# Patient Record
Sex: MALE | Race: WHITE | Employment: OTHER | URBAN - METROPOLITAN AREA
[De-identification: names, ages, dates, MRNs, and addresses within clinical notes are randomized per-mention and may not be internally consistent; named-entity substitution may affect disease eponyms.]

---

## 2017-06-08 ENCOUNTER — HOSPITAL ENCOUNTER (OUTPATIENT)
Dept: MRI IMAGING | Age: 67
Discharge: HOME | End: 2017-06-08
Attending: ORTHOPAEDIC SURGERY
Payer: COMMERCIAL

## 2017-06-08 VITALS — WEIGHT: 188 LBS | BODY MASS INDEX: 26.22 KG/M2

## 2017-06-08 DIAGNOSIS — M54.6 PAIN IN THORACIC SPINE: ICD-10-CM

## 2017-06-08 PROCEDURE — A9585 GADOBUTROL INJECTION: HCPCS | Performed by: ORTHOPAEDIC SURGERY

## 2017-06-08 PROCEDURE — 74011250636 HC RX REV CODE- 250/636: Performed by: ORTHOPAEDIC SURGERY

## 2017-06-08 PROCEDURE — 72157 MRI CHEST SPINE W/O & W/DYE: CPT

## 2017-06-08 RX ADMIN — GADOBUTROL 7.5 ML: 604.72 INJECTION INTRAVENOUS at 07:52

## 2018-10-31 ENCOUNTER — IMPORTED ENCOUNTER (OUTPATIENT)
Dept: URBAN - METROPOLITAN AREA CLINIC 1 | Facility: CLINIC | Age: 68
End: 2018-10-31

## 2018-10-31 PROBLEM — H25.813: Noted: 2018-10-31

## 2018-10-31 PROBLEM — H11.32: Noted: 2018-10-31

## 2018-10-31 PROBLEM — H04.123: Noted: 2018-10-31

## 2018-10-31 PROCEDURE — 92002 INTRM OPH EXAM NEW PATIENT: CPT

## 2018-12-20 ENCOUNTER — IMPORTED ENCOUNTER (OUTPATIENT)
Dept: URBAN - METROPOLITAN AREA CLINIC 1 | Facility: CLINIC | Age: 68
End: 2018-12-20

## 2018-12-20 PROBLEM — H40.1131: Noted: 2018-12-20

## 2018-12-20 PROBLEM — H43.812: Noted: 2018-12-20

## 2018-12-20 PROBLEM — H04.123: Noted: 2018-12-20

## 2018-12-20 PROBLEM — H25.813: Noted: 2018-12-20

## 2018-12-20 PROCEDURE — 92133 CPTRZD OPH DX IMG PST SGM ON: CPT

## 2018-12-20 PROCEDURE — 92014 COMPRE OPH EXAM EST PT 1/>: CPT

## 2019-01-29 ENCOUNTER — IMPORTED ENCOUNTER (OUTPATIENT)
Dept: URBAN - METROPOLITAN AREA CLINIC 1 | Facility: CLINIC | Age: 69
End: 2019-01-29

## 2019-01-29 PROBLEM — H40.1131: Noted: 2019-01-29

## 2019-01-29 PROBLEM — H43.812: Noted: 2019-01-29

## 2019-01-29 PROBLEM — H25.813: Noted: 2019-01-29

## 2019-01-29 PROBLEM — H04.123: Noted: 2019-01-29

## 2019-01-29 PROCEDURE — 92012 INTRM OPH EXAM EST PATIENT: CPT

## 2019-01-29 PROCEDURE — 92083 EXTENDED VISUAL FIELD XM: CPT

## 2019-05-22 ENCOUNTER — IMPORTED ENCOUNTER (OUTPATIENT)
Dept: URBAN - METROPOLITAN AREA CLINIC 1 | Facility: CLINIC | Age: 69
End: 2019-05-22

## 2019-05-22 PROBLEM — H02.834: Noted: 2019-05-22

## 2019-05-22 PROBLEM — H43.813: Noted: 2019-05-22

## 2019-05-22 PROBLEM — H02.831: Noted: 2019-05-22

## 2019-05-22 PROCEDURE — 92012 INTRM OPH EXAM EST PATIENT: CPT

## 2019-07-29 ENCOUNTER — IMPORTED ENCOUNTER (OUTPATIENT)
Dept: URBAN - METROPOLITAN AREA CLINIC 1 | Facility: CLINIC | Age: 69
End: 2019-07-29

## 2019-07-29 PROBLEM — H40.1131: Noted: 2019-07-29

## 2019-07-29 PROBLEM — H43.813: Noted: 2019-07-29

## 2019-07-29 PROCEDURE — 92012 INTRM OPH EXAM EST PATIENT: CPT

## 2020-01-28 ENCOUNTER — IMPORTED ENCOUNTER (OUTPATIENT)
Dept: URBAN - METROPOLITAN AREA CLINIC 1 | Facility: CLINIC | Age: 70
End: 2020-01-28

## 2020-01-28 PROBLEM — H40.1131: Noted: 2020-01-28

## 2020-01-28 PROCEDURE — 92083 EXTENDED VISUAL FIELD XM: CPT

## 2020-01-28 PROCEDURE — 92012 INTRM OPH EXAM EST PATIENT: CPT

## 2022-03-28 ENCOUNTER — OFFICE VISIT (OUTPATIENT)
Dept: SURGERY | Age: 72
End: 2022-03-28
Payer: MEDICARE

## 2022-03-28 VITALS
OXYGEN SATURATION: 98 % | WEIGHT: 176 LBS | DIASTOLIC BLOOD PRESSURE: 87 MMHG | HEIGHT: 71 IN | HEART RATE: 84 BPM | TEMPERATURE: 98.1 F | RESPIRATION RATE: 18 BRPM | BODY MASS INDEX: 24.64 KG/M2 | SYSTOLIC BLOOD PRESSURE: 139 MMHG

## 2022-03-28 DIAGNOSIS — K43.2 VENTRAL INCISIONAL HERNIA: Primary | ICD-10-CM

## 2022-03-28 PROCEDURE — 99202 OFFICE O/P NEW SF 15 MIN: CPT | Performed by: SURGERY

## 2022-03-28 PROCEDURE — 1101F PT FALLS ASSESS-DOCD LE1/YR: CPT | Performed by: SURGERY

## 2022-03-28 PROCEDURE — G8427 DOCREV CUR MEDS BY ELIG CLIN: HCPCS | Performed by: SURGERY

## 2022-03-28 PROCEDURE — 3017F COLORECTAL CA SCREEN DOC REV: CPT | Performed by: SURGERY

## 2022-03-28 PROCEDURE — G8510 SCR DEP NEG, NO PLAN REQD: HCPCS | Performed by: SURGERY

## 2022-03-28 PROCEDURE — G8420 CALC BMI NORM PARAMETERS: HCPCS | Performed by: SURGERY

## 2022-03-28 PROCEDURE — G8536 NO DOC ELDER MAL SCRN: HCPCS | Performed by: SURGERY

## 2022-03-28 RX ORDER — TIMOLOL MALEATE 5 MG/ML
SOLUTION/ DROPS OPHTHALMIC
COMMUNITY
Start: 2022-01-31

## 2022-03-28 RX ORDER — VALSARTAN 80 MG/1
80 TABLET ORAL DAILY
COMMUNITY
Start: 2022-02-09

## 2022-03-28 RX ORDER — ALFUZOSIN HYDROCHLORIDE 10 MG/1
10 TABLET, EXTENDED RELEASE ORAL DAILY
COMMUNITY
Start: 2022-03-10

## 2022-03-28 RX ORDER — DIAZEPAM 5 MG/1
TABLET ORAL
COMMUNITY
Start: 2022-01-08

## 2022-03-28 RX ORDER — CYCLOBENZAPRINE HCL 10 MG
TABLET ORAL
COMMUNITY
Start: 2022-03-10

## 2022-03-28 NOTE — PROGRESS NOTES
Asia Kiran is a 73 y.o. male who presents for evaluation of a ventral incisional hernia. Mr. Regan Rodriguez is s/p multiple spine surgeries in the past including what sounds like an ALIF. He has subsequently developed a bulge on the left side of his abdominal wall. Associated left sided abdominal discomfort with exertion. No nausea or vomiting. No chronic cough or constipation. Found to have a ventral incisional hernia as well as atrophy of the left rectus abdominis muscle on CT scan of the abdomen/pelvis. He has otherwise been in his usual state of health. CT scan abdomen/pelvis with po contrast - 2/8/2022 - Left-sided anterolateral abdominal wall hernia containing loops of small bowel and portions of the sigmoid colon. No evidence of bowel obstruction. Atrophy of left rectus abdominis muscle. No acute intra-abdominal or intra-pelvic process. Nonobstructing left renal calculus. Postoperative changes of the lumbar spine. Brachytherapy seeds within the prostate gland. (By report.  Films not available.)    Past Medical History:   Diagnosis Date    Arthritis     Family history of prostate cancer     Gout     Hypercholesteremia     Hypertrophy of prostate with urinary obstruction and other lower urinary tract symptoms (LUTS)     Kidney stone     Lumbar pain     Malignant neoplasm of prostate ([address] 12/19/2012    cT1c marisol 3 + 4 adenocarcinoma of the prostate under active surveillance since 12/2012    Muscle weakness     Nephrolithiasis     Prostate cancer ([address] 1/14    brachy done at [facility] [address] [facility] (small bowel obstruction) ([facility] [address] 2011    resolved    Ventral incisional hernia 3/28/2022     Past Surgical History:   Procedure Laterality Date    HX LUMBAR FUSION  1986    L4-S1     HX LUMBAR FUSION  Feb 2004    L1-L2     HX LUMBAR FUSION  2009    L1-2 FACET JOINT OBLATION AT [facility](10-15%RELIEF)    HX ORTHOPAEDIC      back    HX OTHER SURGICAL  2006    exploratory surgery on lef foot     HX OTHER SURGICAL  1/14    brachy at [facility]    [facility]    HX UROLOGICAL  12-19-12    PNBx - TRUS Vol 39.02 cc's, [place] 6(3+3)left apex, apex lat, base lat, Dr Rhianna Alexander HX VASECTOMY  2007    Dr Jose Pool     Family History   Problem Relation Age of Onset    Heart Disease Father     Cancer Father         prostate     Social History     Socioeconomic History    Marital status:    Tobacco Use    Smoking status: Never Smoker    Smokeless tobacco: Never Used   Substance and Sexual Activity    Alcohol use: Yes     Alcohol/week: 14.0 standard drinks     Types: 7 Glasses of wine, 7 Cans of beer per week    Drug use: No     Review of systems negative except as noted. Review of Systems   Respiratory: Negative for cough. Gastrointestinal: Positive for abdominal pain. Negative for constipation, nausea and vomiting. Musculoskeletal: Positive for back pain and myalgias. Physical Exam  Vitals reviewed. Constitutional:       General: He is not in acute distress. Appearance: Normal appearance. He is normal weight. HENT:      Head: Normocephalic and atraumatic. Eyes:      General: No scleral icterus. Cardiovascular:      Rate and Rhythm: Normal rate and regular rhythm. Pulmonary:      Effort: Pulmonary effort is normal.      Breath sounds: Normal breath sounds. Abdominal:      General: There is no distension. Palpations: Abdomen is soft. Tenderness: There is no abdominal tenderness. There is no guarding or rebound. Hernia: A hernia is present. Hernia is present in the ventral area (Left paramedian.). Comments: Well healed left paramedian scar. Musculoskeletal:         General: Normal range of motion. Cervical back: Neck supple. Lymphadenopathy:      Cervical: No cervical adenopathy. Neurological:      General: No focal deficit present. Mental Status: He is alert.         ASSESSMENT and PLAN  Mr. Cosme Howard is a 72 yo man with a left, paramedian ventral incisional hernia. In view of the findings on H and P and CT scan report, he should benefit from repair of the ventral incisional hernia as it is symptomatic. Would like to review the CT scan to better assess the hernia prior to surgery. Therefore, will obtain films from [address] [place] [address] In terms of the atrophy of the left rectus abdominis, explained to Mr. Rudy Odonnell that affect the outcome of surgery as the procedure involves repair of the fascial defect and placement of mesh. Activity as tolerated for now. Will see in two more weeks or earlier if need be.       CC: Joaquin Funez MD

## 2022-03-28 NOTE — PROGRESS NOTES
1. Have you been to the ER, urgent care clinic since your last visit? Hospitalized since your last visit? No    2. Have you seen or consulted any other health care providers outside of the [address] [place] [address] since your last visit? Include any pap smears or colon screening.  No

## 2022-04-02 ASSESSMENT — VISUAL ACUITY
OS_CC: 20/40
OS_CC: 20/25
OS_PH: SC 20/30 +1
OS_CC: 20/30
OD_CC: 20/20-2
OD_CC: 20/20
OD_CC: J1
OD_GLARE: 20/400
OD_CC: 20/20
OS_CC: J1
OD_CC: 20/20
OS_CC: 20/30-1
OS_CC: 20/30-2
OS_GLARE: 20/400
OD_CC: 20/25-1
OS_CC: 20/30-2
OD_CC: 20/25-1

## 2022-04-02 ASSESSMENT — TONOMETRY
OS_IOP_MMHG: 21
OS_IOP_MMHG: 26
OS_IOP_MMHG: 19
OS_IOP_MMHG: 20
OD_IOP_MMHG: 17
OD_IOP_MMHG: 20
OD_IOP_MMHG: 19
OD_IOP_MMHG: 26
OS_IOP_MMHG: 18
OD_IOP_MMHG: 18
OD_IOP_MMHG: 18
OS_IOP_MMHG: 24

## 2022-04-02 ASSESSMENT — KERATOMETRY
OS_K2POWER_DIOPTERS: 42.25
OS_AXISANGLE2_DEGREES: 080
OS_AXISANGLE_DEGREES: 170
OS_K1POWER_DIOPTERS: 41.75
OD_AXISANGLE2_DEGREES: 085
OD_K1POWER_DIOPTERS: 41.25
OD_AXISANGLE_DEGREES: 175
OD_K2POWER_DIOPTERS: 41.50

## 2022-04-18 ENCOUNTER — OFFICE VISIT (OUTPATIENT)
Dept: SURGERY | Age: 72
End: 2022-04-18
Payer: MEDICARE

## 2022-04-18 VITALS
SYSTOLIC BLOOD PRESSURE: 136 MMHG | HEIGHT: 71 IN | TEMPERATURE: 97.3 F | DIASTOLIC BLOOD PRESSURE: 82 MMHG | WEIGHT: 195.7 LBS | BODY MASS INDEX: 27.4 KG/M2 | HEART RATE: 61 BPM | RESPIRATION RATE: 20 BRPM | OXYGEN SATURATION: 98 %

## 2022-04-18 DIAGNOSIS — K43.2 INCISIONAL HERNIA, WITHOUT OBSTRUCTION OR GANGRENE: Primary | ICD-10-CM

## 2022-04-18 DIAGNOSIS — K42.9 UMBILICAL HERNIA WITHOUT OBSTRUCTION AND WITHOUT GANGRENE: ICD-10-CM

## 2022-04-18 PROCEDURE — G8536 NO DOC ELDER MAL SCRN: HCPCS | Performed by: SURGERY

## 2022-04-18 PROCEDURE — 1101F PT FALLS ASSESS-DOCD LE1/YR: CPT | Performed by: SURGERY

## 2022-04-18 PROCEDURE — 99215 OFFICE O/P EST HI 40 MIN: CPT | Performed by: SURGERY

## 2022-04-18 PROCEDURE — G8432 DEP SCR NOT DOC, RNG: HCPCS | Performed by: SURGERY

## 2022-04-18 PROCEDURE — 3017F COLORECTAL CA SCREEN DOC REV: CPT | Performed by: SURGERY

## 2022-04-18 PROCEDURE — G8419 CALC BMI OUT NRM PARAM NOF/U: HCPCS | Performed by: SURGERY

## 2022-04-18 PROCEDURE — G8427 DOCREV CUR MEDS BY ELIG CLIN: HCPCS | Performed by: SURGERY

## 2022-04-18 RX ORDER — TRAMADOL HYDROCHLORIDE 50 MG/1
TABLET ORAL
COMMUNITY
Start: 2022-01-05

## 2022-04-18 RX ORDER — LATANOPROST/PF 0.005 %
1 DROPS OPHTHALMIC (EYE) DAILY
COMMUNITY

## 2022-04-18 NOTE — PROGRESS NOTES
HISTORY OF PRESENT ILLNESS  Riky Mai is a 73 y.o. male. Reviewed and independently interpreted CT scan images from earlier this month. The oblique muscles are robust.  He has a large hernia to the left of midline. The external oblique and external rectus fascia appears to be intact. He is missing a 10 cm long segment of his left rectus muscle this is associated with a detachment of the internal oblique and transversalis muscles. At the cephalad extent of the hernia, there is a loop of bowel caught between the external and internal oblique muscles. There are clips or calcified material along the lateral border of the left rectus muscle in the left upper quadrant. He has a small umbilical hernia as well. Hernia present for a while. In the middle of November became worse  Pain. No nausea. Appetite ok  +chronic back pain  Multiple prior back surgeries. Some BPH    He has been working on some exercises for his core and recently his back and the hernia have actually been feeling better. ____________________________________________________________________________  Patient presents with:  Possible Hernia: seen at the request of himself for evaluation of abdominal wall hernia    /82 (BP 1 Location: Left upper arm, BP Patient Position: Sitting)   Pulse 61   Temp 97.3 °F (36.3 °C) (Temporal)   Resp 20   Ht 5' 11\" (1.803 m)   Wt 88.8 kg (195 lb 11.2 oz)   SpO2 98%   BMI 27.29 kg/m²   Past Medical History:  No date: Arthritis  No date: Family history of prostate cancer  No date: Hypercholesteremia  No date: Hypertrophy of prostate with urinary obstruction and other   lower urinary tract symptoms (LUTS)  No date: Kidney stone  No date: Lumbar pain  12/19/2012:  Malignant neoplasm of prostate [facility])      Comment:  cT1c marisol 3 + 4 adenocarcinoma of the prostate under                active surveillance since 12/2012  No date: Muscle weakness  No date: Nephrolithiasis  1/14: Prostate cancer [facility])      Comment:  brachy done at [facility]  2011: SBO (small bowel obstruction) ([address]      Comment:  resolved  3/28/2022: Ventral incisional hernia  Past Surgical History:  1986: HX LUMBAR FUSION      Comment:  L4-S1   Feb 2004: HX LUMBAR FUSION      Comment:  L1-L2   2009: HX LUMBAR FUSION      Comment:  L1-2 FACET JOINT OBLATION AT [facility](10-15%RELIEF)  No date: HX ORTHOPAEDIC      Comment:  back  2006: HX OTHER SURGICAL      Comment:  exploratory surgery on lef foot   1/14: HX OTHER SURGICAL      Comment:  brachy at [address] [facility] [address]: HX UROLOGICAL      Comment:  [facility]  12-19-12: HX UROLOGICAL      Comment:  PNBx - TRUS Vol 39.02 cc's, [place] 6(3+3)left apex,                apex lat, base lat, Dr Connie Guido  2007: HX VASECTOMY      Comment:  Dr Linsey Thorne History    Socioeconomic History      Marital status:     Tobacco Use      Smoking status: Never Smoker      Smokeless tobacco: Never Used    Vaping Use      Vaping Use: Never used    Substance and Sexual Activity      Alcohol use: Yes        Alcohol/week: 14.0 standard drinks        Types: 7 Glasses of wine, 7 Cans of beer per week      Drug use: No    Review of patient's family history indicates:  Problem: Heart Disease      Relation: Father          Age of Onset: (Not Specified)  Problem: Cancer      Relation: Father          Age of Onset: (Not Specified)          Comment: prostate  Problem: Stroke      Relation: Father          Age of Onset: (Not Specified)    Current Outpatient Medications:  latanoprost, PF, 0.005 % drop, Administer 1 Drop to both eyes daily. traMADoL (ULTRAM) 50 mg tablet, 1 tablet as needed  valsartan (DIOVAN) 80 mg tablet, Take 80 mg by mouth daily. alfuzosin SR (UROXATRAL) 10 mg SR tablet, Take 10 mg by mouth daily. timolol (TIMOPTIC) 0.5 % ophthalmic solution, INSTILL 1 DROP IN BOTH EYES EVERY MORNING. REMEMBER TO HOLD TEAR DUCTS CLOSED FOR A MINUTE AFTER.   cyclobenzaprine (FLEXERIL) 10 mg tablet, TAKE 1 TABLET BY MOUTH THREE TIMES DAILY AS NEEDED  montelukast (SINGULAIR) 10 mg tablet, TK 1 T PO QHS  diazePAM (VALIUM) 5 mg tablet, TAKE 1 TABLET BY MOUTH ONCE DAILY AS NEEDED (Patient not taking: Reported on 3/28/2022)    No current facility-administered medications for this visit. Allergies:  -- Dilaudid (Hydromorphone) -- Other (comments)    --  Pt states\" did not work\"  _____________________________________________________________________________        Possible Hernia  The history is provided by the patient. This is a chronic problem. The current episode started more than 1 week ago. The problem occurs constantly. The problem has been gradually worsening. Associated symptoms include abdominal pain. Pertinent negatives include no chest pain, no headaches and no shortness of breath. The symptoms are aggravated by exertion. The symptoms are relieved by rest. The treatment provided no relief. Review of Systems   Constitutional: Negative for chills, fever and weight loss. HENT: Negative for ear pain. Eyes: Negative for pain. Respiratory: Negative for shortness of breath. Cardiovascular: Negative for chest pain. Gastrointestinal: Positive for abdominal pain. Negative for blood in stool. Genitourinary: Negative for hematuria. Musculoskeletal: Negative for joint pain. Skin: Negative for rash. Neurological: Negative for dizziness, focal weakness, seizures and headaches. Endo/Heme/Allergies: Does not bruise/bleed easily. Psychiatric/Behavioral: The patient does not have insomnia. Physical Exam  Constitutional:       General: He is not in acute distress. Appearance: He is well-developed. HENT:      Head: Normocephalic. Mouth/Throat:      Pharynx: No oropharyngeal exudate. Eyes:      General: No scleral icterus. Pupils: Pupils are equal, round, and reactive to light. Neck:      Trachea: No tracheal deviation. Cardiovascular:      Rate and Rhythm: Normal rate and regular rhythm.       Heart sounds: Normal heart sounds. Pulmonary:      Effort: Pulmonary effort is normal.      Breath sounds: Normal breath sounds. No wheezing. Abdominal:      General: There is no distension. Palpations: Abdomen is soft. There is no mass. Tenderness: There is no abdominal tenderness. Hernia: A hernia is present. Hernia is present in the umbilical area and ventral area. Musculoskeletal:         General: Normal range of motion. Cervical back: Neck supple. Lymphadenopathy:      Cervical: No cervical adenopathy. Skin:     General: Skin is warm. Findings: No erythema or rash. Neurological:      Mental Status: He is alert and oriented to person, place, and time. Psychiatric:         Behavior: Behavior normal.         ASSESSMENT and PLAN    ICD-10-CM ICD-9-CM    1. Incisional hernia, without obstruction or gangrene  K43.2 553.21    2. Umbilical hernia without obstruction and without gangrene  K42.9 553.1      Reviewed findings and imaging with Riky Mai     He has had an extensive surgical history including multiple surgeries for his back which has resulted in the denervation of a segment of his abdominal wall he is essentially missing a 10 cm length of his rectus muscle. We discussed the goal of hernia surgery which is primarily to restore abdominal wall function. Based on imaging and exam this will require a fairly complex repair. I would like to reconstruct his abdominal wall in layers which in this situation can only be done through an open approach. I recommend against a robotic repair because in order to maintain durability of this repair he needs fixation both anteriorly and posteriorly. I had an extensive discussion with him regarding the risks, benefits, and alternatives of proceeding with a(n) Open Incisional Hernia Repair with Mesh with separation of components.   Risks,benefits, and alternatives were discussed including the risk of anesthesia, bleeding, infection, injury to bowel, chronic pain, and recurrence were discussed. She will think about our discussion and let us know when he is ready to proceed. He is considering waiting till later in the year. Short-term risk of strangulation is low so I think this is reasonable. He will follow back up in the office in the early fall. Expressed understanding of our discussion and agreeable with the plan. I had an extensive and thorough discussion with Kassandra Santa regarding current diagnosis and treatment recommendations. Total time spend with him was 70 minutes.   This included the following:  preparing to see the patient (reviewing prior records and tests),  performing a medically appropriate examination and/or evaluation,  counseling and educating the patient/family/caregiver,  documenting clinical information in the electronic or other health record,  independently interpreting results and communicating results to the patient/family/caregiver,  care coordination

## 2022-04-18 NOTE — PROGRESS NOTES
Identified pt with two pt identifiers(name and ). Reviewed record in preparation for visit and have obtained necessary documentation. All patient medications has been reviewed. Chief Complaint   Patient presents with    Possible Hernia     seen at the request of himself for evaluation of abdominal wall hernia       Health Maintenance Due   Topic    Hepatitis C Screening     COVID-19 Vaccine (1)    Colorectal Cancer Screening Combo     Shingrix Vaccine Age 50> (1 of 2)    Medicare Yearly Exam        Vitals:    22 1517   BP: 136/82   Pulse: 61   Resp: 20   Temp: 97.3 °F (36.3 °C)   TempSrc: Temporal   SpO2: 98%   Weight: 88.8 kg (195 lb 11.2 oz)   Height: 5' 11\" (1.803 m)   PainSc:   2   PainLoc: Back       4. Have you been to the ER, urgent care clinic since your last visit? Hospitalized since your last visit? No    5. Have you seen or consulted any other health care providers outside of the [address] [place] [address] since your last visit? Include any pap smears or colon screening. No      Patient is accompanied by self I have received verbal consent from Richardson Song to discuss any/all medical information while they are present in the room.

## 2022-04-19 PROBLEM — K42.9 UMBILICAL HERNIA WITHOUT OBSTRUCTION AND WITHOUT GANGRENE: Status: ACTIVE | Noted: 2022-04-19

## 2022-04-19 PROBLEM — K43.2 INCISIONAL HERNIA, WITHOUT OBSTRUCTION OR GANGRENE: Status: ACTIVE | Noted: 2022-03-28

## 2022-05-03 ENCOUNTER — TRANSCRIBE ORDER (OUTPATIENT)
Dept: GENERAL RADIOLOGY | Age: 72
End: 2022-05-03

## 2022-05-03 ENCOUNTER — HOSPITAL ENCOUNTER (OUTPATIENT)
Dept: GENERAL RADIOLOGY | Age: 72
Discharge: HOME | End: 2022-05-03
Attending: INTERNAL MEDICINE
Payer: MEDICARE

## 2022-05-03 DIAGNOSIS — R06.09 DYSPNEA ON EFFORT: Primary | ICD-10-CM

## 2022-05-03 DIAGNOSIS — R06.09 DYSPNEA ON EFFORT: ICD-10-CM

## 2022-05-03 PROCEDURE — 71046 X-RAY EXAM CHEST 2 VIEWS: CPT

## 2022-10-17 ENCOUNTER — OFFICE VISIT (OUTPATIENT)
Dept: SURGERY | Age: 72
End: 2022-10-17
Payer: MEDICARE

## 2022-10-17 VITALS
DIASTOLIC BLOOD PRESSURE: 84 MMHG | TEMPERATURE: 97.4 F | HEART RATE: 59 BPM | OXYGEN SATURATION: 98 % | BODY MASS INDEX: 25.9 KG/M2 | WEIGHT: 185 LBS | SYSTOLIC BLOOD PRESSURE: 136 MMHG | HEIGHT: 71 IN

## 2022-10-17 DIAGNOSIS — K43.2 INCISIONAL HERNIA, WITHOUT OBSTRUCTION OR GANGRENE: Primary | ICD-10-CM

## 2022-10-17 PROCEDURE — G8417 CALC BMI ABV UP PARAM F/U: HCPCS | Performed by: SURGERY

## 2022-10-17 PROCEDURE — 1101F PT FALLS ASSESS-DOCD LE1/YR: CPT | Performed by: SURGERY

## 2022-10-17 PROCEDURE — 1123F ACP DISCUSS/DSCN MKR DOCD: CPT | Performed by: SURGERY

## 2022-10-17 PROCEDURE — 99215 OFFICE O/P EST HI 40 MIN: CPT | Performed by: SURGERY

## 2022-10-17 PROCEDURE — G8427 DOCREV CUR MEDS BY ELIG CLIN: HCPCS | Performed by: SURGERY

## 2022-10-17 PROCEDURE — G8510 SCR DEP NEG, NO PLAN REQD: HCPCS | Performed by: SURGERY

## 2022-10-17 PROCEDURE — 3017F COLORECTAL CA SCREEN DOC REV: CPT | Performed by: SURGERY

## 2022-10-17 PROCEDURE — G8536 NO DOC ELDER MAL SCRN: HCPCS | Performed by: SURGERY

## 2022-10-17 RX ORDER — ATORVASTATIN CALCIUM 20 MG/1
20 TABLET, FILM COATED ORAL DAILY
COMMUNITY
Start: 2022-09-09

## 2022-10-17 NOTE — PROGRESS NOTES
Chief Complaint   Patient presents with    Follow-up     Eval Hernia        First saw him in April. Having occasional pain  Appetite ok  No nausea    Occ heartburn. No regurg. Occ h2 blocker    Has a cardiac echo scheduled      Reviewed and independently interpreted CT scan images from February. The oblique muscles are robust.  He has a large hernia to the left of midline. The external oblique and external rectus fascia appears to be intact. He is missing a 10 cm long segment of his left rectus muscle this is associated with a detachment of the internal oblique and transversalis muscles. At the cephalad extent of the hernia, there is a loop of bowel caught between the external and internal oblique muscles. There are clips or calcified material along the lateral border of the left rectus muscle in the left upper quadrant. He has a small umbilical hernia as well. Exam: Small asymptomatic hernia at his umbilicus. Large hernia in his lower abdomen to the left of midline. There is a hernia associated with a abdominal bulge. No acute skin changes. Reviewed findings and imaging with Orville Petersen      He has had an extensive surgical history including multiple surgeries for his back which has resulted in the denervation of a segment of his abdominal wall he is essentially missing a 10 cm length of his rectus muscle. We discussed the goal of hernia surgery which is primarily to restore abdominal wall function. Based on imaging and exam this will require a fairly complex repair. I would like to reconstruct his abdominal wall in layers which in this situation can only be done through an open approach. I recommend against a robotic repair because in order to maintain durability of this repair he needs fixation both anteriorly and posteriorly.      I had an extensive discussion with him regarding the risks, benefits, and alternatives of proceeding with a(n) Open Incisional Hernia Repair with Mesh with separation of components. Risks,benefits, and alternatives were discussed including the risk of anesthesia, bleeding, infection, injury to bowel, chronic pain, and recurrence were discussed. He wants surgery done this winter. We will get another CT scan to reassess the abdominal wall and the hernia prior to surgery. We will finalize plans of the surgery once I have had a chance to review the CT images and he is medically cleared. Expressed understanding of our discussion and agreeable with the plan. I had an extensive and thorough discussion with Maria C De Jesus regarding current diagnosis and treatment recommendations. Total time spend with him was 40 minutes.   This included the following:  preparing to see the patient (reviewing prior records and tests),  performing a medically appropriate examination and/or evaluation,  counseling and educating the patient/family/caregiver,  documenting clinical information in the electronic or other health record,  independently interpreting results and communicating results to the patient/family/caregiver,  ordering medications, tests, or procedures,  care coordination

## 2022-10-17 NOTE — PROGRESS NOTES
Identified pt with two pt identifiers (name and ). Reviewed chart in preparation for visit and have obtained necessary documentation. Jerry Blanco is a 81 y.o. male  Chief Complaint   Patient presents with    Follow-up     Eval Hernia      Visit Vitals  /84 (BP 1 Location: Left upper arm, BP Patient Position: Sitting, BP Cuff Size: Large adult)   Pulse (!) 59   Temp 97.4 °F (36.3 °C) (Tympanic)   Ht 5' 11\" (1.803 m)   Wt 185 lb (83.9 kg)   SpO2 98%   BMI 25.80 kg/m²       1. Have you been to the ER, urgent care clinic since your last visit? Hospitalized since your last visit? No    2. Have you seen or consulted any other health care providers outside of the [address] [place] [address] since your last visit? Include any pap smears or colon screening.  No

## 2022-10-17 NOTE — Clinical Note
10/17/2022    Patient: Maria C De Jesus   YOB: 1950   Date of Visit: 10/17/2022     Claudia Nair MD  [address] [address]  Via Fax: [phone]     Drew Mullen MD  Via     Dear MD Drew Rosales MD,      Thank you for referring Mr. Daren Wofl to  [facility]  for evaluation. My notes for this consultation are attached. If you have questions, please do not hesitate to call me. I look forward to following your patient along with you.       Sincerely,    Anna Guajardo MD

## 2022-11-08 ENCOUNTER — HOSPITAL ENCOUNTER (OUTPATIENT)
Dept: CT IMAGING | Age: 72
Discharge: HOME | End: 2022-11-08
Attending: SURGERY
Payer: MEDICARE

## 2022-11-08 DIAGNOSIS — K43.2 INCISIONAL HERNIA, WITHOUT OBSTRUCTION OR GANGRENE: ICD-10-CM

## 2022-11-08 LAB — CREAT BLD-MCNC: 1.2 MG/DL (ref 0.6–1.3)

## 2022-11-08 PROCEDURE — 74011000636 HC RX REV CODE- 636: Performed by: SURGERY

## 2022-11-08 PROCEDURE — 82565 ASSAY OF CREATININE: CPT

## 2022-11-08 PROCEDURE — 74177 CT ABD & PELVIS W/CONTRAST: CPT

## 2022-11-08 RX ADMIN — IOPAMIDOL 100 ML: 755 INJECTION, SOLUTION INTRAVENOUS at 10:00

## 2022-11-11 ENCOUNTER — TELEPHONE (OUTPATIENT)
Dept: SURGERY | Age: 72
End: 2022-11-11

## 2022-11-11 NOTE — TELEPHONE ENCOUNTER
Reviewed recent CT scan images. His abdominal wall appears similar to before. There is a least a 10 cm section of his rectus muscle that is missing in the left lower quadrant that is leading to a very large bulge when he stands up. In his lower abdomen the left oblique muscles are retracted. Tried calling him. Left a message. Okay to proceed with Open Incisional Hernia Repair with Mesh with separation of components. Left a message for him to call us with any questions.     OW

## 2022-12-21 ENCOUNTER — TELEPHONE (OUTPATIENT)
Dept: SURGERY | Age: 72
End: 2022-12-21

## 2022-12-27 ENCOUNTER — TELEPHONE (OUTPATIENT)
Dept: SURGERY | Age: 72
End: 2022-12-27

## 2022-12-27 NOTE — TELEPHONE ENCOUNTER
Informed patient  Krunal & provider nurse Amado Lopez is out of the office & will return 12/29/22. Express understanding.

## 2022-12-29 NOTE — TELEPHONE ENCOUNTER
Pt called again would like to get surgery scheduled with katharine please call back     [phone]

## 2023-01-09 NOTE — PERIOP NOTES
[facility]  Preoperative Instructions        Surgery Date 1/13/23          Time of Arrival to be called @ [phone]    1. On the day of your surgery, please report to the Surgical Services Registration Desk and sign in at your designated time. The Surgery Center is located to the right of the Emergency Room. 2. You must have someone with you to drive you home. You should not drive a car for 24 hours following surgery. Please make arrangements for a friend or family member to stay with you for the first 24 hours after your surgery. 3. Do not have anything to eat or drink (including water, gum, mints, coffee, juice) after midnight ?? .? This may not apply to medications prescribed by your physician. ?(Please note below the special instructions with medications to take the morning of your procedure.)    4. We recommend you do not drink any alcoholic beverages for 24 hours before and after your surgery. 5. Contact your surgeons office for instructions on the following medications: non-steroidal anti-inflammatory drugs (i.e. Advil, Aleve), vitamins, and supplements. (Some surgeons will want you to stop these medications prior to surgery and others may allow you to take them)  **If you are currently taking Plavix, Coumadin, Aspirin and/or other blood-thinning agents, contact your surgeon for instructions. ** Your surgeon will partner with the physician prescribing these medications to determine if it is safe to stop or if you need to continue taking. Please do not stop taking these medications without instructions from your surgeon    6. Wear comfortable clothes. Wear glasses instead of contacts. Do not bring any money or jewelry. Please bring picture ID, insurance card, and any prearranged co-payment or hospital payment. Do not wear make-up, particularly mascara the morning of your surgery. Do not wear nail polish, particularly if you are having foot /hand surgery.   Wear your hair loose or down, no ponytails, buns, tam pins or clips. All body piercings must be removed. Please shower with antibacterial soap for three consecutive days before and on the morning of surgery, but do not apply any lotions, powders or deodorants after the shower on the day of surgery. Please use a fresh towels after each shower. Please sleep in clean clothes and change bed linens the night before surgery. Please do not shave for 48 hours prior to surgery. Shaving of the face is acceptable. 7. You should understand that if you do not follow these instructions your surgery may be cancelled. If your physical condition changes (I.e. fever, cold or flu) please contact your surgeon as soon as possible. 8. It is important that you be on time. If a situation occurs where you may be late, please call [phone] (OR Holding Area). 9. If you have any questions and or problems, please call [phone] (Pre-admission Testing). 10. Your surgery time may be subject to change. You will receive a phone call the evening prior if your time changes. 11.  If having outpatient surgery, you must have someone to drive you here, stay with you during the duration of your stay, and to drive you home at time of discharge. TAKE ALL MEDICATIONS DAY OF SURGERY EXCEPT:no exceptions. May take morning medications with a sip of water     I understand a pre-operative phone call will be made to verify my surgery time. In the event that I am not available, I give permission for a message to be left on my answering service and/or with another person?   yes         ___________________      __________   _________    (Signature of Patient)             (Witness)                (Date and Time)

## 2023-01-12 ENCOUNTER — ANESTHESIA EVENT (OUTPATIENT)
Dept: SURGERY | Age: 73
End: 2023-01-12
Payer: MEDICARE

## 2023-01-12 NOTE — ANESTHESIA PREPROCEDURE EVALUATION
Anesthetic History   No history of anesthetic complications            Review of Systems / Medical History  Patient summary reviewed, nursing notes reviewed and pertinent labs reviewed    Pulmonary        Sleep apnea           Neuro/Psych   Within defined limits           Cardiovascular              Hyperlipidemia    Exercise tolerance: >4 METS     GI/Hepatic/Renal         Renal disease: stones       Endo/Other        Arthritis and cancer     Other Findings   Comments: Hx of lumbar fusion    Hx of prostate cancer           Physical Exam    Airway  Mallampati: II  TM Distance: > 6 cm  Neck ROM: normal range of motion        Cardiovascular  Regular rate and rhythm,  S1 and S2 normal,  no murmur, click, rub, or gallop             Dental  No notable dental hx       Pulmonary  Breath sounds clear to auscultation               Abdominal  GI exam deferred       Other Findings            Anesthetic Plan    ASA: 2  Anesthesia type: general          Induction: Intravenous  Anesthetic plan and risks discussed with: Patient

## 2023-01-13 ENCOUNTER — ANESTHESIA (OUTPATIENT)
Dept: SURGERY | Age: 73
End: 2023-01-13
Payer: MEDICARE

## 2023-01-13 ENCOUNTER — HOSPITAL ENCOUNTER (OUTPATIENT)
Age: 73
Setting detail: OBSERVATION
Discharge: HOME | End: 2023-01-14
Attending: SURGERY | Admitting: SURGERY
Payer: MEDICARE

## 2023-01-13 DIAGNOSIS — K43.2 INCISIONAL HERNIA, WITHOUT OBSTRUCTION OR GANGRENE: Primary | ICD-10-CM

## 2023-01-13 PROCEDURE — C1781 MESH (IMPLANTABLE): HCPCS | Performed by: SURGERY

## 2023-01-13 PROCEDURE — 74011250636 HC RX REV CODE- 250/636: Performed by: STUDENT IN AN ORGANIZED HEALTH CARE EDUCATION/TRAINING PROGRAM

## 2023-01-13 PROCEDURE — 74011250637 HC RX REV CODE- 250/637: Performed by: STUDENT IN AN ORGANIZED HEALTH CARE EDUCATION/TRAINING PROGRAM

## 2023-01-13 PROCEDURE — 77030035236 HC SUT PDS STRATFX BARB J&J -B: Performed by: SURGERY

## 2023-01-13 PROCEDURE — 74011250636 HC RX REV CODE- 250/636: Performed by: ANESTHESIOLOGY

## 2023-01-13 PROCEDURE — 74011250637 HC RX REV CODE- 250/637: Performed by: SURGERY

## 2023-01-13 PROCEDURE — 76010000133 HC OR TIME 3 TO 3.5 HR: Performed by: SURGERY

## 2023-01-13 PROCEDURE — 76060000037 HC ANESTHESIA 3 TO 3.5 HR: Performed by: SURGERY

## 2023-01-13 PROCEDURE — 77030008684 HC TU ET CUF COVD -B: Performed by: STUDENT IN AN ORGANIZED HEALTH CARE EDUCATION/TRAINING PROGRAM

## 2023-01-13 PROCEDURE — 77030012407 HC DRN WND BARD -B: Performed by: SURGERY

## 2023-01-13 PROCEDURE — 74011000250 HC RX REV CODE- 250: Performed by: STUDENT IN AN ORGANIZED HEALTH CARE EDUCATION/TRAINING PROGRAM

## 2023-01-13 PROCEDURE — 77030008462 HC STPLR SKN PROX J&J -A: Performed by: SURGERY

## 2023-01-13 PROCEDURE — 77030010507 HC ADH SKN DERMBND J&J -B: Performed by: SURGERY

## 2023-01-13 PROCEDURE — G0378 HOSPITAL OBSERVATION PER HR: HCPCS

## 2023-01-13 PROCEDURE — 77030036554: Performed by: SURGERY

## 2023-01-13 PROCEDURE — 74011000250 HC RX REV CODE- 250: Performed by: SURGERY

## 2023-01-13 PROCEDURE — 77030002986 HC SUT PROL J&J -A: Performed by: SURGERY

## 2023-01-13 PROCEDURE — 74011250636 HC RX REV CODE- 250/636: Performed by: SURGERY

## 2023-01-13 PROCEDURE — 77030026438 HC STYL ET INTUB CARD -A: Performed by: STUDENT IN AN ORGANIZED HEALTH CARE EDUCATION/TRAINING PROGRAM

## 2023-01-13 PROCEDURE — 74011250636 HC RX REV CODE- 250/636

## 2023-01-13 PROCEDURE — 2709999900 HC NON-CHARGEABLE SUPPLY: Performed by: SURGERY

## 2023-01-13 PROCEDURE — 77030018673: Performed by: SURGERY

## 2023-01-13 PROCEDURE — 77030002966 HC SUT PDS J&J -A: Performed by: SURGERY

## 2023-01-13 PROCEDURE — 2709999900 HC NON-CHARGEABLE SUPPLY

## 2023-01-13 PROCEDURE — 77030033138 HC SUT PGA STRATFX J&J -B: Performed by: SURGERY

## 2023-01-13 PROCEDURE — 77030013079 HC BLNKT BAIR HGGR 3M -A: Performed by: STUDENT IN AN ORGANIZED HEALTH CARE EDUCATION/TRAINING PROGRAM

## 2023-01-13 PROCEDURE — 76210000000 HC OR PH I REC 2 TO 2.5 HR: Performed by: SURGERY

## 2023-01-13 PROCEDURE — 77030003029 HC SUT VCRL J&J -B: Performed by: SURGERY

## 2023-01-13 PROCEDURE — 88302 TISSUE EXAM BY PATHOLOGIST: CPT

## 2023-01-13 PROCEDURE — 77030031139 HC SUT VCRL2 J&J -A: Performed by: SURGERY

## 2023-01-13 DEVICE — IMPLANTABLE DEVICE: Type: IMPLANTABLE DEVICE | Site: ABDOMEN | Status: FUNCTIONAL

## 2023-01-13 DEVICE — BARD SOFT MESH, 12" X 12" (30. 5 CM X 30.5 CM)
Type: IMPLANTABLE DEVICE | Site: ABDOMEN | Status: FUNCTIONAL
Brand: BARD

## 2023-01-13 RX ORDER — PHENYLEPHRINE HCL IN 0.9% NACL 0.4MG/10ML
SYRINGE (ML) INTRAVENOUS AS NEEDED
Status: DISCONTINUED | OUTPATIENT
Start: 2023-01-13 | End: 2023-01-13 | Stop reason: HOSPADM

## 2023-01-13 RX ORDER — FENTANYL CITRATE 50 UG/ML
25 INJECTION, SOLUTION INTRAMUSCULAR; INTRAVENOUS
Status: DISCONTINUED | OUTPATIENT
Start: 2023-01-13 | End: 2023-01-13 | Stop reason: HOSPADM

## 2023-01-13 RX ORDER — SODIUM CHLORIDE 0.9 % (FLUSH) 0.9 %
5-40 SYRINGE (ML) INJECTION AS NEEDED
Status: DISCONTINUED | OUTPATIENT
Start: 2023-01-13 | End: 2023-01-13 | Stop reason: HOSPADM

## 2023-01-13 RX ORDER — PROCHLORPERAZINE EDISYLATE 5 MG/ML
5 INJECTION INTRAMUSCULAR; INTRAVENOUS
Status: COMPLETED | OUTPATIENT
Start: 2023-01-13 | End: 2023-01-13

## 2023-01-13 RX ORDER — SODIUM CHLORIDE 0.9 % (FLUSH) 0.9 %
5-40 SYRINGE (ML) INJECTION EVERY 8 HOURS
Status: DISCONTINUED | OUTPATIENT
Start: 2023-01-13 | End: 2023-01-13 | Stop reason: HOSPADM

## 2023-01-13 RX ORDER — ONDANSETRON 2 MG/ML
INJECTION INTRAMUSCULAR; INTRAVENOUS AS NEEDED
Status: DISCONTINUED | OUTPATIENT
Start: 2023-01-13 | End: 2023-01-13 | Stop reason: HOSPADM

## 2023-01-13 RX ORDER — LIDOCAINE HYDROCHLORIDE 20 MG/ML
INJECTION, SOLUTION EPIDURAL; INFILTRATION; INTRACAUDAL; PERINEURAL AS NEEDED
Status: DISCONTINUED | OUTPATIENT
Start: 2023-01-13 | End: 2023-01-13 | Stop reason: HOSPADM

## 2023-01-13 RX ORDER — OXYCODONE HYDROCHLORIDE 5 MG/1
5 TABLET ORAL
Status: DISCONTINUED | OUTPATIENT
Start: 2023-01-13 | End: 2023-01-14 | Stop reason: HOSPADM

## 2023-01-13 RX ORDER — SODIUM CHLORIDE, SODIUM LACTATE, POTASSIUM CHLORIDE, CALCIUM CHLORIDE 600; 310; 30; 20 MG/100ML; MG/100ML; MG/100ML; MG/100ML
25 INJECTION, SOLUTION INTRAVENOUS CONTINUOUS
Status: DISCONTINUED | OUTPATIENT
Start: 2023-01-13 | End: 2023-01-13 | Stop reason: HOSPADM

## 2023-01-13 RX ORDER — FENTANYL CITRATE 50 UG/ML
INJECTION, SOLUTION INTRAMUSCULAR; INTRAVENOUS AS NEEDED
Status: DISCONTINUED | OUTPATIENT
Start: 2023-01-13 | End: 2023-01-13 | Stop reason: HOSPADM

## 2023-01-13 RX ORDER — HYDROMORPHONE HYDROCHLORIDE 1 MG/ML
1 INJECTION, SOLUTION INTRAMUSCULAR; INTRAVENOUS; SUBCUTANEOUS
Status: DISCONTINUED | OUTPATIENT
Start: 2023-01-13 | End: 2023-01-13

## 2023-01-13 RX ORDER — DEXTROSE, SODIUM CHLORIDE, AND POTASSIUM CHLORIDE 5; .45; .15 G/100ML; G/100ML; G/100ML
50 INJECTION INTRAVENOUS CONTINUOUS
Status: DISCONTINUED | OUTPATIENT
Start: 2023-01-13 | End: 2023-01-14 | Stop reason: HOSPADM

## 2023-01-13 RX ORDER — SODIUM CHLORIDE 0.9 % (FLUSH) 0.9 %
5-40 SYRINGE (ML) INJECTION AS NEEDED
Status: DISCONTINUED | OUTPATIENT
Start: 2023-01-13 | End: 2023-01-14 | Stop reason: HOSPADM

## 2023-01-13 RX ORDER — OXYCODONE HYDROCHLORIDE 5 MG/1
10 TABLET ORAL
Status: DISCONTINUED | OUTPATIENT
Start: 2023-01-13 | End: 2023-01-14 | Stop reason: HOSPADM

## 2023-01-13 RX ORDER — HYDRALAZINE HYDROCHLORIDE 20 MG/ML
10 INJECTION INTRAMUSCULAR; INTRAVENOUS
Status: DISCONTINUED | OUTPATIENT
Start: 2023-01-13 | End: 2023-01-14 | Stop reason: HOSPADM

## 2023-01-13 RX ORDER — ONDANSETRON 4 MG/1
4 TABLET, ORALLY DISINTEGRATING ORAL
Status: DISCONTINUED | OUTPATIENT
Start: 2023-01-13 | End: 2023-01-14 | Stop reason: HOSPADM

## 2023-01-13 RX ORDER — ROCURONIUM BROMIDE 10 MG/ML
INJECTION, SOLUTION INTRAVENOUS AS NEEDED
Status: DISCONTINUED | OUTPATIENT
Start: 2023-01-13 | End: 2023-01-13 | Stop reason: HOSPADM

## 2023-01-13 RX ORDER — PROPOFOL 10 MG/ML
INJECTION, EMULSION INTRAVENOUS AS NEEDED
Status: DISCONTINUED | OUTPATIENT
Start: 2023-01-13 | End: 2023-01-13 | Stop reason: HOSPADM

## 2023-01-13 RX ORDER — FENTANYL CITRATE 50 UG/ML
INJECTION, SOLUTION INTRAMUSCULAR; INTRAVENOUS
Status: COMPLETED
Start: 2023-01-13 | End: 2023-01-13

## 2023-01-13 RX ORDER — ONDANSETRON 2 MG/ML
4 INJECTION INTRAMUSCULAR; INTRAVENOUS AS NEEDED
Status: DISCONTINUED | OUTPATIENT
Start: 2023-01-13 | End: 2023-01-13 | Stop reason: HOSPADM

## 2023-01-13 RX ORDER — KETOROLAC TROMETHAMINE 30 MG/ML
INJECTION, SOLUTION INTRAMUSCULAR; INTRAVENOUS AS NEEDED
Status: DISCONTINUED | OUTPATIENT
Start: 2023-01-13 | End: 2023-01-13 | Stop reason: HOSPADM

## 2023-01-13 RX ORDER — ACETAMINOPHEN 500 MG
1000 TABLET ORAL ONCE
Status: DISCONTINUED | OUTPATIENT
Start: 2023-01-13 | End: 2023-01-13 | Stop reason: HOSPADM

## 2023-01-13 RX ORDER — ACETAMINOPHEN 500 MG
1000 TABLET ORAL 4 TIMES DAILY
Status: DISCONTINUED | OUTPATIENT
Start: 2023-01-13 | End: 2023-01-14 | Stop reason: HOSPADM

## 2023-01-13 RX ORDER — SODIUM CHLORIDE 0.9 % (FLUSH) 0.9 %
5-40 SYRINGE (ML) INJECTION EVERY 8 HOURS
Status: DISCONTINUED | OUTPATIENT
Start: 2023-01-13 | End: 2023-01-14 | Stop reason: HOSPADM

## 2023-01-13 RX ORDER — HYDROMORPHONE HYDROCHLORIDE 2 MG/ML
2 INJECTION, SOLUTION INTRAMUSCULAR; INTRAVENOUS; SUBCUTANEOUS
Status: DISCONTINUED | OUTPATIENT
Start: 2023-01-13 | End: 2023-01-13

## 2023-01-13 RX ORDER — TAMSULOSIN HYDROCHLORIDE 0.4 MG/1
0.4 CAPSULE ORAL DAILY
Status: DISCONTINUED | OUTPATIENT
Start: 2023-01-13 | End: 2023-01-14 | Stop reason: HOSPADM

## 2023-01-13 RX ORDER — ONDANSETRON 2 MG/ML
4 INJECTION INTRAMUSCULAR; INTRAVENOUS
Status: DISCONTINUED | OUTPATIENT
Start: 2023-01-13 | End: 2023-01-14 | Stop reason: HOSPADM

## 2023-01-13 RX ORDER — MORPHINE SULFATE 2 MG/ML
INJECTION, SOLUTION INTRAMUSCULAR; INTRAVENOUS
Status: COMPLETED
Start: 2023-01-13 | End: 2023-01-13

## 2023-01-13 RX ORDER — VALSARTAN 80 MG/1
80 TABLET ORAL DAILY
Status: DISCONTINUED | OUTPATIENT
Start: 2023-01-14 | End: 2023-01-14 | Stop reason: HOSPADM

## 2023-01-13 RX ORDER — MORPHINE SULFATE 2 MG/ML
2 INJECTION, SOLUTION INTRAMUSCULAR; INTRAVENOUS
Status: COMPLETED | OUTPATIENT
Start: 2023-01-13 | End: 2023-01-13

## 2023-01-13 RX ORDER — MORPHINE SULFATE 2 MG/ML
2-4 INJECTION, SOLUTION INTRAMUSCULAR; INTRAVENOUS
Status: DISCONTINUED | OUTPATIENT
Start: 2023-01-13 | End: 2023-01-14 | Stop reason: HOSPADM

## 2023-01-13 RX ORDER — GLYCOPYRROLATE 0.2 MG/ML
INJECTION INTRAMUSCULAR; INTRAVENOUS AS NEEDED
Status: DISCONTINUED | OUTPATIENT
Start: 2023-01-13 | End: 2023-01-13 | Stop reason: HOSPADM

## 2023-01-13 RX ORDER — DEXAMETHASONE SODIUM PHOSPHATE 4 MG/ML
INJECTION, SOLUTION INTRA-ARTICULAR; INTRALESIONAL; INTRAMUSCULAR; INTRAVENOUS; SOFT TISSUE AS NEEDED
Status: DISCONTINUED | OUTPATIENT
Start: 2023-01-13 | End: 2023-01-13 | Stop reason: HOSPADM

## 2023-01-13 RX ORDER — PROCHLORPERAZINE EDISYLATE 5 MG/ML
INJECTION INTRAMUSCULAR; INTRAVENOUS
Status: DISPENSED
Start: 2023-01-13 | End: 2023-01-14

## 2023-01-13 RX ORDER — OXYCODONE HYDROCHLORIDE 5 MG/1
5 TABLET ORAL AS NEEDED
Status: DISCONTINUED | OUTPATIENT
Start: 2023-01-13 | End: 2023-01-13 | Stop reason: HOSPADM

## 2023-01-13 RX ORDER — EPHEDRINE SULFATE/0.9% NACL/PF 50 MG/5 ML
SYRINGE (ML) INTRAVENOUS AS NEEDED
Status: DISCONTINUED | OUTPATIENT
Start: 2023-01-13 | End: 2023-01-13 | Stop reason: HOSPADM

## 2023-01-13 RX ORDER — ENOXAPARIN SODIUM 100 MG/ML
40 INJECTION SUBCUTANEOUS DAILY
Status: DISCONTINUED | OUTPATIENT
Start: 2023-01-14 | End: 2023-01-14 | Stop reason: HOSPADM

## 2023-01-13 RX ORDER — MIDAZOLAM HYDROCHLORIDE 1 MG/ML
INJECTION, SOLUTION INTRAMUSCULAR; INTRAVENOUS AS NEEDED
Status: DISCONTINUED | OUTPATIENT
Start: 2023-01-13 | End: 2023-01-13 | Stop reason: HOSPADM

## 2023-01-13 RX ORDER — BUPIVACAINE HYDROCHLORIDE AND EPINEPHRINE 5; 5 MG/ML; UG/ML
INJECTION, SOLUTION PERINEURAL AS NEEDED
Status: DISCONTINUED | OUTPATIENT
Start: 2023-01-13 | End: 2023-01-13 | Stop reason: HOSPADM

## 2023-01-13 RX ORDER — KETAMINE HYDROCHLORIDE 100 MG/ML
INJECTION INTRAMUSCULAR; INTRAVENOUS AS NEEDED
Status: DISCONTINUED | OUTPATIENT
Start: 2023-01-13 | End: 2023-01-13 | Stop reason: HOSPADM

## 2023-01-13 RX ADMIN — SUGAMMADEX 200 MG: 100 INJECTION, SOLUTION INTRAVENOUS at 10:35

## 2023-01-13 RX ADMIN — Medication 2.5 MG: at 07:37

## 2023-01-13 RX ADMIN — OXYCODONE HYDROCHLORIDE 5 MG: 5 TABLET ORAL at 19:51

## 2023-01-13 RX ADMIN — PROCHLORPERAZINE EDISYLATE 5 MG: 5 INJECTION INTRAMUSCULAR; INTRAVENOUS at 12:53

## 2023-01-13 RX ADMIN — GLYCOPYRROLATE 0.1 MG: 0.2 INJECTION, SOLUTION INTRAMUSCULAR; INTRAVENOUS at 08:13

## 2023-01-13 RX ADMIN — LIDOCAINE HYDROCHLORIDE 100 MG: 20 INJECTION, SOLUTION EPIDURAL; INFILTRATION; INTRACAUDAL; PERINEURAL at 07:30

## 2023-01-13 RX ADMIN — Medication 2.5 MG: at 07:41

## 2023-01-13 RX ADMIN — Medication 5 MG: at 07:46

## 2023-01-13 RX ADMIN — ROCURONIUM BROMIDE 10 MG: 10 INJECTION INTRAVENOUS at 07:58

## 2023-01-13 RX ADMIN — FENTANYL CITRATE 25 MCG: 50 INJECTION INTRAMUSCULAR; INTRAVENOUS at 08:08

## 2023-01-13 RX ADMIN — FENTANYL CITRATE 25 MCG: 50 INJECTION, SOLUTION INTRAMUSCULAR; INTRAVENOUS at 11:57

## 2023-01-13 RX ADMIN — DEXAMETHASONE SODIUM PHOSPHATE 8 MG: 4 INJECTION, SOLUTION INTRAMUSCULAR; INTRAVENOUS at 07:39

## 2023-01-13 RX ADMIN — KETAMINE HYDROCHLORIDE 20 MG: 100 INJECTION, SOLUTION, CONCENTRATE INTRAMUSCULAR; INTRAVENOUS at 07:58

## 2023-01-13 RX ADMIN — FENTANYL CITRATE 100 MCG: 50 INJECTION INTRAMUSCULAR; INTRAVENOUS at 07:30

## 2023-01-13 RX ADMIN — PROPOFOL 10 MG: 10 INJECTION, EMULSION INTRAVENOUS at 10:21

## 2023-01-13 RX ADMIN — FENTANYL CITRATE 25 MCG: 50 INJECTION INTRAMUSCULAR; INTRAVENOUS at 09:00

## 2023-01-13 RX ADMIN — Medication 40 MCG: at 07:46

## 2023-01-13 RX ADMIN — WATER 2 G: 1 INJECTION INTRAMUSCULAR; INTRAVENOUS; SUBCUTANEOUS at 07:46

## 2023-01-13 RX ADMIN — MIDAZOLAM HYDROCHLORIDE 2 MG: 1 INJECTION, SOLUTION INTRAMUSCULAR; INTRAVENOUS at 07:27

## 2023-01-13 RX ADMIN — PROPOFOL 10 MG: 10 INJECTION, EMULSION INTRAVENOUS at 10:34

## 2023-01-13 RX ADMIN — MORPHINE SULFATE 2 MG: 2 INJECTION, SOLUTION INTRAMUSCULAR; INTRAVENOUS at 12:15

## 2023-01-13 RX ADMIN — KETAMINE HYDROCHLORIDE 10 MG: 100 INJECTION, SOLUTION, CONCENTRATE INTRAMUSCULAR; INTRAVENOUS at 08:42

## 2023-01-13 RX ADMIN — ROCURONIUM BROMIDE 10 MG: 10 INJECTION INTRAVENOUS at 09:29

## 2023-01-13 RX ADMIN — SODIUM CHLORIDE, POTASSIUM CHLORIDE, SODIUM LACTATE AND CALCIUM CHLORIDE 25 ML/HR: 600; 310; 30; 20 INJECTION, SOLUTION INTRAVENOUS at 06:48

## 2023-01-13 RX ADMIN — FENTANYL CITRATE 25 MCG: 50 INJECTION, SOLUTION INTRAMUSCULAR; INTRAVENOUS at 12:06

## 2023-01-13 RX ADMIN — PROPOFOL 150 MG: 10 INJECTION, EMULSION INTRAVENOUS at 07:30

## 2023-01-13 RX ADMIN — FENTANYL CITRATE 25 MCG: 50 INJECTION INTRAMUSCULAR; INTRAVENOUS at 08:21

## 2023-01-13 RX ADMIN — Medication 80 MCG: at 07:56

## 2023-01-13 RX ADMIN — ONDANSETRON HYDROCHLORIDE 4 MG: 2 INJECTION, SOLUTION INTRAMUSCULAR; INTRAVENOUS at 09:50

## 2023-01-13 RX ADMIN — KETOROLAC TROMETHAMINE 15 MG: 30 INJECTION, SOLUTION INTRAMUSCULAR; INTRAVENOUS at 07:46

## 2023-01-13 RX ADMIN — POTASSIUM CHLORIDE, DEXTROSE MONOHYDRATE AND SODIUM CHLORIDE 100 ML/HR: 150; 5; 450 INJECTION, SOLUTION INTRAVENOUS at 12:10

## 2023-01-13 RX ADMIN — Medication 80 MCG: at 07:50

## 2023-01-13 RX ADMIN — ROCURONIUM BROMIDE 10 MG: 10 INJECTION INTRAVENOUS at 08:33

## 2023-01-13 RX ADMIN — ROCURONIUM BROMIDE 10 MG: 10 INJECTION INTRAVENOUS at 08:59

## 2023-01-13 RX ADMIN — ACETAMINOPHEN 1000 MG: 500 TABLET ORAL at 19:50

## 2023-01-13 RX ADMIN — KETAMINE HYDROCHLORIDE 10 MG: 100 INJECTION, SOLUTION, CONCENTRATE INTRAMUSCULAR; INTRAVENOUS at 08:21

## 2023-01-13 RX ADMIN — SODIUM CHLORIDE, PRESERVATIVE FREE 10 ML: 5 INJECTION INTRAVENOUS at 22:40

## 2023-01-13 RX ADMIN — KETAMINE HYDROCHLORIDE 10 MG: 100 INJECTION, SOLUTION, CONCENTRATE INTRAMUSCULAR; INTRAVENOUS at 09:00

## 2023-01-13 RX ADMIN — FENTANYL CITRATE 25 MCG: 50 INJECTION INTRAMUSCULAR; INTRAVENOUS at 08:33

## 2023-01-13 RX ADMIN — ROCURONIUM BROMIDE 50 MG: 10 INJECTION INTRAVENOUS at 07:31

## 2023-01-13 RX ADMIN — FENTANYL CITRATE 25 MCG: 50 INJECTION INTRAMUSCULAR; INTRAVENOUS at 10:21

## 2023-01-13 RX ADMIN — FENTANYL CITRATE 25 MCG: 50 INJECTION INTRAMUSCULAR; INTRAVENOUS at 09:28

## 2023-01-13 RX ADMIN — ONDANSETRON 4 MG: 2 INJECTION INTRAMUSCULAR; INTRAVENOUS at 12:20

## 2023-01-13 RX ADMIN — Medication 3 AMPULE: at 06:48

## 2023-01-13 RX ADMIN — Medication 5 MG: at 08:13

## 2023-01-13 NOTE — BRIEF OP NOTE
Brief Postoperative Note    Patient: Jannie Starsk  YOB: 1950  MRN: [id number]    Date of Procedure: 1/13/2023     Pre-Op Diagnosis: INCISIONAL HERNIA    Post-Op Diagnosis: INCISIONAL HERNIA    Procedure(s):  OPEN INCISIONAL HERNIA REPAIR WITH MESH WITH SEPARATION OF COMPONENTS    Surgeon(s):  Minh Velásquez MD    Surgical Assistant: Surg Asst-1: Jaciel Grant    Anesthesia: General + local     Estimated Blood Loss (mL): Minimal    Complications: None    Specimens:   ID Type Source Tests Collected by Time Destination   1 : hernia sac and excess soft tissue Preservative Abdomen  Minh Velásquez MD 1/13/2023 1009 Pathology        Implants:   Implant Name Type Inv.  Item Serial No.  Lot No. LRB No. Used Action   MESH SURG 25X20 CM BIOMATERIAL PREPERITONEAL ENFORM - [id number]  MESH SURG 25X20 CM BIOMATERIAL PREPERITONEAL ENFORM [id number] [employer]_WD NA N/A 1 Implanted   MESH GIBSON [id number] INGUINAL POLYPR SQ L PORE MFIL SF - [id number]  MESH GIBSON [id number] INGUINAL POLYPR SQ L PORE MFIL SF [id number] BARD DAVOL_WD [id number] N/A 1 Implanted       Drains:   Keaton-Quiroz Drain 01/13/23 Left Abdomen (Active)       Keaton-Quiroz Drain 01/13/23 Right Abdomen (Active)       Findings: +hernia

## 2023-01-13 NOTE — ANESTHESIA POSTPROCEDURE EVALUATION
Procedure(s):  OPEN INCISIONAL HERNIA REPAIR WITH MESH WITH SEPARATION OF COMPONENTS.    general    Anesthesia Post Evaluation      Multimodal analgesia: multimodal analgesia used between 6 hours prior to anesthesia start to PACU discharge  Patient location during evaluation: PACU  Patient participation: complete - patient participated  Level of consciousness: sleepy but conscious  Pain management: adequate  Airway patency: patent  Anesthetic complications: no  Cardiovascular status: acceptable, blood pressure returned to baseline and hemodynamically stable  Respiratory status: acceptable and nasal cannula  Hydration status: acceptable  Post anesthesia nausea and vomiting:  controlled  Final Post Anesthesia Temperature Assessment:  Normothermia (36.0-37.5 degrees C)      INITIAL Post-op Vital signs:   Vitals Value Taken Time   /67 01/13/23 1100   Temp 36.6 °C (97.8 °F) 01/13/23 1206   Pulse 72 01/13/23 1159   Resp 12 01/13/23 1159   SpO2 100 % 01/13/23 1159   Vitals shown include unvalidated device data.

## 2023-01-13 NOTE — PROGRESS NOTES
.End of Shift Note    Bedside shift change report given to Luís Peter (oncoming nurse) by Alban Barlow LPN (offgoing nurse). Report included the following information SBAR, Kardex, OR Summary, Procedure Summary, Intake/Output, MAR, and Recent Results    Shift worked:  7am-7pm     Shift summary and any significant changes:     Post op plan of care, manage I&O, monitor ashton output and ANNE outputs. Manage pain needs. Up in chair x 1 for dinner, tolerated dinner, ate 40%. Concerns for physician to address:  Labs, Discharge planning     Zone phone for oncoming shift:   [phone]       Activity:  Activity Level: Up with Assistance  Number times ambulated in hallways past shift: 0  Number of times OOB to chair past shift: 1    Cardiac:   Cardiac Monitoring: No      Cardiac Rhythm: Sinus Rhythm    Access:  Current line(s): PIV     Genitourinary:   Urinary status: voiding    Respiratory:   O2 Device: Nasal cannula  Chronic home O2 use?: NO, CPAP at night  Incentive spirometer at bedside: YES       GI:     Current diet:  ADULT DIET Regular; NPO while in bed. Can have diet as ordered if in a chair.   Passing flatus: NO  Tolerating current diet: YES       Pain Management:   Patient states pain is manageable on current regimen: YES    Skin:  Chris Score: 21  Interventions: increase time out of bed and nutritional support     Patient Safety:  Fall Score:    Interventions: gripper socks, pt to call before getting OOB, and stay with me (per policy)       Length of Stay:  Expected LOS: - - -  Actual LOS: 0      Alban Barlow LPN

## 2023-01-13 NOTE — PROGRESS NOTES
Opal Rosas TRANSFER - IN REPORT:    Verbal report received from KRISTINA Bennett(name) on Gavino Medina  being received from PACU(unit) for routine progression of care      Report consisted of patients Situation, Background, Assessment and   Recommendations(SBAR). Information from the following report(s) SBAR, Kardex, OR Summary, Procedure Summary, Intake/Output, MAR, and Recent Results was reviewed with the receiving nurse. Opportunity for questions and clarification was provided. Assessment completed upon patients arrival to unit and care assumed.

## 2023-01-13 NOTE — H&P
Pre-Op History and Physical    Subjective: Brando Blackwell is a 81 y.o. male who is here for Procedure(s):  OPEN INCISIONAL HERNIA REPAIR WITH MESH WITH SEPARATION OF COMPONENTS    Past Medical History:   Diagnosis Date    Arthritis     Chronic pain     back    Family history of prostate cancer     Hypercholesteremia     Seen Dr Ramana Tamez - cardiologist [facility]    Hypertrophy of prostate with urinary obstruction and other lower urinary tract symptoms (LUTS)     Kidney stone     Lumbar pain     Malignant neoplasm of prostate ([address] 12/19/2012    cT1c marisol 3 + 4 adenocarcinoma of the prostate under active surveillance since 12/2012    Muscle weakness     Nephrolithiasis     Prostate cancer ([address] 01/2014    brachy done at [facility]    SBO (small bowel obstruction) ([facility] 2011    resolved    Sleep apnea     uses cpap    Ventral incisional hernia 03/28/2022      Past Surgical History:   Procedure Laterality Date    HX LITHOTRIPSY      done twice, then surgical removal of stone with second stone    HX LUMBAR FUSION  1986    L4-S1     HX LUMBAR FUSION  02/2004    L1-L2     HX LUMBAR FUSION  2009    L1-2 FACET JOINT OBLATION AT [facility](10-15%RELIEF)    HX OTHER SURGICAL  2006    exploratory surgery on left foot    HX OTHER SURGICAL  01/2014    brachy at [facility], prostate procedure with radiation seeds    HX UROLOGICAL  2000    [facility]    HX UROLOGICAL  12/19/2012    PNBx - TRUS Vol 39.02 cc's, [place] 6(3+3)left apex, apex lat, base lat, Dr Hall Folds VASECTOMY  2007    Dr Jerome Stanford     Family History   Problem Relation Age of Onset    Heart Disease Father     Cancer Father         prostate    Stroke Father     Heart Surgery Brother       Social History     Tobacco Use    Smoking status: Never    Smokeless tobacco: Never   Substance Use Topics    Alcohol use:  Yes     Alcohol/week: 14.0 standard drinks     Types: 7 Glasses of wine, 7 Cans of beer per week     Comment: daily       Prior to Admission medications    Medication Sig Start Date End Date Taking? Authorizing Provider   atorvastatin (LIPITOR) 20 mg tablet Take 20 mg by mouth daily. 9/9/22  Yes Provider, Historical   latanoprost, PF, 0.005 % drop Administer 1 Drop to both eyes daily. Yes Provider, Historical   traMADoL (ULTRAM) 50 mg tablet Take 50 mg by mouth every six (6) hours as needed. 1/5/22  Yes Provider, Historical   valsartan (DIOVAN) 80 mg tablet Take 80 mg by mouth daily. 2/9/22  Yes Provider, Historical   alfuzosin SR (UROXATRAL) 10 mg SR tablet Take 10 mg by mouth daily. 3/10/22  Yes Provider, Historical   timolol (TIMOPTIC) 0.5 % ophthalmic solution Administer 1 Drop to both eyes daily. 1/31/22  Yes Provider, Historical   cyclobenzaprine (FLEXERIL) 10 mg tablet Take 5 mg by mouth three (3) times daily as needed. 3/10/22  Yes Provider, Historical   montelukast (SINGULAIR) 10 mg tablet TK 1 T PO QHS 11/10/17  Yes Provider, Historical     Allergies   Allergen Reactions    Dilaudid [Hydromorphone] Other (comments)     Pt states\" did not work\"        Review of Systems:  A comprehensive review of systems was negative except for that written in the History of Present Illness. Objective: Intake and Output:    No intake/output data recorded. No intake/output data recorded. Physical Exam:   Lungs: clear to auscultation bilaterally  Heart: regular rate and rhythm  Abdomen: soft, + hernia        Data Review:   No results found for this or any previous visit (from the past 24 hour(s)). Assessment:     Active Problems:    * No active hospital problems. *      Plan:     Reviewed recent CT scan images. His abdominal wall appears similar to before. There is a least a 10 cm section of his rectus muscle that is missing in the left lower quadrant that is leading to a very large bulge when he stands up. In his lower abdomen the left oblique muscles are retracted.     I had an extensive discussion with Ashu Boyce regarding the risks, benefits, and alternatives of proceeding with a  Procedure(s):  OPEN INCISIONAL HERNIA REPAIR WITH MESH WITH SEPARATION OF COMPONENTS. Risks, benefits, and alternatives of surgery including the risk of anesthesia, bleeding, infection, injury to bowel, recurrence, chronic pain, and the lack of symptomatic improvement were discussed and Betzy Eva is in agreement to proceed.         Signed By: Gera Sewell MD     January 13, 2023

## 2023-01-14 VITALS
DIASTOLIC BLOOD PRESSURE: 81 MMHG | SYSTOLIC BLOOD PRESSURE: 156 MMHG | HEART RATE: 68 BPM | RESPIRATION RATE: 17 BRPM | WEIGHT: 180.34 LBS | OXYGEN SATURATION: 99 % | BODY MASS INDEX: 25.25 KG/M2 | TEMPERATURE: 97.7 F | HEIGHT: 71 IN

## 2023-01-14 LAB
ANION GAP SERPL CALC-SCNC: 6 MMOL/L (ref 5–15)
BUN SERPL-MCNC: 17 MG/DL (ref 6–20)
BUN/CREAT SERPL: 17 (ref 12–20)
CALCIUM SERPL-MCNC: 8.8 MG/DL (ref 8.5–10.1)
CHLORIDE SERPL-SCNC: 107 MMOL/L (ref 97–108)
CO2 SERPL-SCNC: 26 MMOL/L (ref 21–32)
CREAT SERPL-MCNC: 1.02 MG/DL (ref 0.7–1.3)
GLUCOSE SERPL-MCNC: 150 MG/DL (ref 65–100)
HCT VFR BLD AUTO: 36.5 % (ref 36.6–50.3)
HGB BLD-MCNC: 11.9 G/DL (ref 12.1–17)
MAGNESIUM SERPL-MCNC: 2.4 MG/DL (ref 1.6–2.4)
POTASSIUM SERPL-SCNC: 4.8 MMOL/L (ref 3.5–5.1)
SODIUM SERPL-SCNC: 139 MMOL/L (ref 136–145)

## 2023-01-14 PROCEDURE — 97161 PT EVAL LOW COMPLEX 20 MIN: CPT

## 2023-01-14 PROCEDURE — 36415 COLL VENOUS BLD VENIPUNCTURE: CPT

## 2023-01-14 PROCEDURE — 85018 HEMOGLOBIN: CPT

## 2023-01-14 PROCEDURE — 80048 BASIC METABOLIC PNL TOTAL CA: CPT

## 2023-01-14 PROCEDURE — G0378 HOSPITAL OBSERVATION PER HR: HCPCS

## 2023-01-14 PROCEDURE — 74011250636 HC RX REV CODE- 250/636: Performed by: SURGERY

## 2023-01-14 PROCEDURE — 97116 GAIT TRAINING THERAPY: CPT

## 2023-01-14 PROCEDURE — 74011250637 HC RX REV CODE- 250/637: Performed by: SURGERY

## 2023-01-14 PROCEDURE — 83735 ASSAY OF MAGNESIUM: CPT

## 2023-01-14 RX ORDER — OXYCODONE HYDROCHLORIDE 5 MG/1
5 TABLET ORAL
Qty: 12 TABLET | Refills: 0 | Status: SHIPPED | OUTPATIENT
Start: 2023-01-14 | End: 2023-01-17

## 2023-01-14 RX ORDER — DEXTROSE, SODIUM CHLORIDE, AND POTASSIUM CHLORIDE 5; .45; .15 G/100ML; G/100ML; G/100ML
INJECTION INTRAVENOUS CONTINUOUS
Status: DISCONTINUED | OUTPATIENT
Start: 2023-01-14 | End: 2023-01-14 | Stop reason: SDUPTHER

## 2023-01-14 RX ADMIN — ACETAMINOPHEN 1000 MG: 500 TABLET ORAL at 12:52

## 2023-01-14 RX ADMIN — ENOXAPARIN SODIUM 40 MG: 100 INJECTION SUBCUTANEOUS at 10:24

## 2023-01-14 RX ADMIN — TAMSULOSIN HYDROCHLORIDE 0.4 MG: 0.4 CAPSULE ORAL at 10:23

## 2023-01-14 RX ADMIN — VALSARTAN 80 MG: 80 TABLET, FILM COATED ORAL at 10:23

## 2023-01-14 RX ADMIN — ACETAMINOPHEN 1000 MG: 500 TABLET ORAL at 10:24

## 2023-01-14 NOTE — OP NOTES
[address]  OPERATIVE REPORT    Name:  Donnell Olson  MR#:  [id number]  :  1950  ACCOUNT #:  [de-identified]  DATE OF SERVICE:  2023    PREOPERATIVE DIAGNOSIS:  Incisional hernia. POSTOPERATIVE DIAGNOSIS:  Incisional hernia. PROCEDURES PERFORMED:  1.  Myocutaneous advancement flap of the rectus muscle, right. 2.  Myocutaneous advancement flap of the rectus muscle, left. 3.  Repair of incisional hernia, 15 cm x 20 cm. 4.  Insertion of absorbable mesh in the retrorectal space. 5.  Insertion of permanent mesh overlay. SURGEON:  Elizabeth Pelaez MD    ASSISTANT:  ***    ANESTHESIA:  General plus local.    COMPLICATIONS:  None. SPECIMENS REMOVED:  Hernia sac and excess soft tissue. IMPLANTS:  See brief op note. ESTIMATED BLOOD LOSS:  Minimal.    FINDINGS:  The patient's lower abdomen was explored. He was noted to have a missing segment of rectus muscle about 15 cm long on the left. This resulted in a hernia measuring 15 cm wide and 20 cm in craniocaudal dimension. Repair was undertaken after performing bilateral TAR releases which allowed mobilization of the abdominal muscles towards the midline and replacement of the rectus muscle with an absorbable mesh placement in the retrorectal space and a lightweight polypropylene mesh placed in overlay fashion. The right drain was left in the retrorectal space. The incision was carried through the skin with Prolene suture. The left drain was left in the subcutaneous space and secured with a nylon suture. Prevena dressing was placed over the wound after closing it with staples. INDICATIONS:  The patient is a 44-year-old gentleman who had previously undergone a vertical lateral abdominal incision. This resulted in denervation of segments of abdominal wall and creation of a large hernia. He has been brought to the operating room today for repair. Risks, benefits, and alternatives were discussed.   Consent was placed in the chart. PROCEDURE:  After satisfactory induction of general anesthesia, the patient was kept in supine position. The patient's abdomen was prepped and draped sterilely including the use of Ioban. A vertical midline incision was made. The incision was carried down to the preperitoneal space using cautery. The preperitoneal space was entered inferiorly and carried laterally. The retrorectal space was encountered. He was missing his rectus muscle. There were only some very attenuated atrophied fibers present in this plane. Transverse abdominis release was created on the left, which allowed entering the preperitoneal space laterally and this allowed for the implication of the retracted oblique muscles on the left side. The internal, external, and obliques were kept robust and intact. Care was taken to maintain the lateral perforators superiorly to avoid any further denervation of his abdominal wall. A more limited transverse abdominis release was created on the right side, this essentially allowed for entering the preperitoneal space and allow for good coverage of the mesh. Once bilateral released were accomplished, there were a few areas of peritoneum that had to be repaired with 3-0 Vicryl. Care was taken to avoid injury to any intraperitoneal structures. Irrigation was applied. Final irrigation noted to be clear. A complete hemostasis was noted. A 25 x 20 cm mesh was placed on the diagonal, which allowed for filling the space quite nicely. When apex of the mesh was tacked to the pubic tubercle with a 2-0 PDS, the cord structures were identified. It was confirmed not to have an inguinal hernia, and mesh was further tacked along John's ligament laterally on the left side. The other corner of the mesh was tacked to his lateral abdominal wall on the left with additional PDS sutures.   Care was taken to protect the nerves and the mesh was further tacked circumferentially within the retrorectus and preperitoneal space with additional interrupted 2-0 PDS. The mesh was noted to be doing quite nicely in the retrorectal space. A ANNE drain was placed over the mesh and secured as noted above. The anterior fascia was closed with fascial closure barbed #1 suture. The preperitoneal space was defined with cautery. The polypropylene lightweight mesh was placed on the diagonal and cut to a 30 cm x 15 cm oval to fit the space. A 2-0 PDS was ran bilaterally to secure the mesh with additional interrupted PDS at the apices. A ANNE drain was placed over the mesh and brought up through a separate stab incision on the left and secured as noted above. Irrigation was again applied. Final irrigation was clear. Complete hemostasis was noted. Soft tissue was approximated with running PDS. The umbilicus was tacked to the underlying repair. The skin was closed with staples. The drain sites were covered with occlusive dressing. The incision was covered with Prevena wound management system and placed to suction. The patient remained stable *** operating room.       MD CAROL Pedersen/[email or web address]  D:  01/13/2023 11:34  T:  01/13/2023 20:57  JOB #:  [id number]

## 2023-01-14 NOTE — PROGRESS NOTES
SURGERY PROGRESS NOTE      Admit Date: 2023    POD 1 Day Post-Op    Procedure: Procedure(s):  OPEN INCISIONAL HERNIA REPAIR WITH MESH WITH SEPARATION OF COMPONENTS      Subjective:     Patient states pain is controlled. Denies nausea. Tolerating PO>       Objective:     Visit Vitals  /74 (BP 1 Location: Right upper arm, BP Patient Position: At rest)   Pulse 95   Temp 97.5 °F (36.4 °C)   Resp 20   Ht 5' 11\" (1.803 m)   Wt 81.8 kg (180 lb 5.4 oz)   SpO2 99%   BMI 25.15 kg/m²        Temp (24hrs), Av.9 °F (36.6 °C), Min:97.2 °F (36.2 °C), Max:99 °F (37.2 °C)      No intake/output data recorded.  1901 -  0700  In: 240 [P.O.:240]  Out: 1615 [Urine:1340; Drains:275]    Physical Exam:    General:  alert, cooperative, no distress, appears stated age   Abdomen: soft, bowel sounds active, non-tender    Right ANNE - 180 cc bloody with clots  Left ANNE - 95 cc serosanguinous    Incision:   healing well, no drainage, no erythema, no hernia, no seroma, no swelling, no dehiscence, incision well approximated           Lab Results   Component Value Date/Time    WBC 9.0 2017 01:24 PM    HGB 11.9 (L) 2023 05:30 AM    HCT 36.5 (L) 2023 05:30 AM    PLATELET 226  01:24 PM    MCV 89.6 2017 01:24 PM     Lab Results   Component Value Date/Time    GFR est non-AA >60 2017 01:24 PM    GFR est AA >60 2017 01:24 PM    Creatinine 1.02 2023 05:30 AM    Creatinine (POC) 1.20 2022 10:36 AM    BUN 17 2023 05:30 AM    Sodium 139 2023 05:30 AM    Potassium 4.8 2023 05:30 AM    Chloride 107 2023 05:30 AM    CO2 26 2023 05:30 AM    Magnesium 2.4 2023 05:30 AM       Assessment:     Active Problems:    Incisional hernia (2023)    Progressing as expected     Plan:        D/C left ANNE  PT consult  Possible D/C later today.

## 2023-01-14 NOTE — PROGRESS NOTES
PHYSICAL THERAPY EVALUATION/DISCHARGE  Patient: Ramez Burns (49 y.o. male)  Date: 1/14/2023  Primary Diagnosis: Incisional hernia [K43.2]  Procedure(s) (LRB):  OPEN INCISIONAL HERNIA REPAIR WITH MESH WITH SEPARATION OF COMPONENTS (N/A) 1 Day Post-Op   Precautions:          ASSESSMENT  Based on the objective data described below, the patient presents with overall good and steady mobility. Pt was received in supine and cleared by nursing to mobilize. Pt aware of log roll to limit trunk flexion. Performed all mobility at a mod I or independent level. Ambulated down the crespo and negotiated steps without difficulty. Educated on walking program and to monitor endurance to progress himself accordingly. He was returned to the room and perform toileting and hygiene without difficulty. Returned to supine. Functional Outcome Measure: The patient scored 95/100 on the barthel outcome measure which is indicative of independent in basic care. Other factors to consider for discharge:      Further skilled acute physical therapy is not indicated at this time. PLAN :  Recommendation for discharge: (in order for the patient to meet his/her long term goals)  Outpatient physical therapy follow up recommended for strengthening when cleared by MD    This discharge recommendation:  Has not yet been discussed the attending provider and/or case management    IF patient discharges home will need the following DME: none       SUBJECTIVE:   Patient stated I feel better after the walks in the crespo.     OBJECTIVE DATA SUMMARY:   HISTORY:    Past Medical History:   Diagnosis Date    Arthritis     Chronic pain     back    Family history of prostate cancer     Hypercholesteremia     Seen Dr Marii Phillips - cardiologist [facility]    Hypertrophy of prostate with urinary obstruction and other lower urinary tract symptoms (LUTS)     Kidney stone     Lumbar pain     Malignant neoplasm of prostate ([facility] [address] 12/19/2012    cT1c marisol 3 + 4 adenocarcinoma of the prostate under active surveillance since 12/2012    Muscle weakness     Nephrolithiasis     Prostate cancer ([facility] [address] 01/2014    brachy done at [facility]    SBO (small bowel obstruction) ([facility] [address] 2011    resolved    Sleep apnea     uses cpap    Ventral incisional hernia 03/28/2022     Past Surgical History:   Procedure Laterality Date    HX LITHOTRIPSY      done twice, then surgical removal of stone with second stone    HX LUMBAR FUSION  1986    L4-S1     HX LUMBAR FUSION  02/2004    L1-L2     HX LUMBAR FUSION  2009    L1-2 FACET JOINT OBLATION AT [facility](10-15%RELIEF)    HX OTHER SURGICAL  2006    exploratory surgery on left foot    HX OTHER SURGICAL  01/2014    brachy at [facility], prostate procedure with radiation seeds    HX UROLOGICAL  2000    [facility]    HX UROLOGICAL  12/19/2012    PNBx - TRUS Vol 39.02 cc's, [place] 6(3+3)left apex, apex lat, base lat, Dr Sirena Gavin  2007    Dr Temo Garcia       Prior level of function: independent  Personal factors and/or comorbidities impacting plan of care:     Home Situation  Home Environment: Private residence  # Steps to Enter: 3  One/Two Story Residence: Two story  # of Interior Steps: 13  Lift Chair Available: No  Living Alone: No  Support Systems: Spouse/Significant Other  Patient Expects to be Discharged to[de-identified] Home with home health  Current DME Used/Available at Home: Blood pressure cuff    EXAMINATION/PRESENTATION/DECISION MAKING:   Critical Behavior:  Neurologic State: Alert  Orientation Level: Oriented X4  Cognition: Appropriate safety awareness     Hearing:   Auditory  Auditory Impairment: Hard of hearing, bilateral  Hearing Aids/Status: Bilateral, At home  Skin:  intact  Edema: none  Range Of Motion:  AROM: Generally decreased, functional           PROM: Within functional limits           Strength:    Strength: Generally decreased, functional                    Tone & Sensation:   Tone: Normal              Sensation: Intact Coordination:  Coordination: Within functional limits  Vision:      Functional Mobility:  Bed Mobility:  Rolling: Modified independent  Supine to Sit: Modified independent  Sit to Supine: Modified independent  Scooting: Modified independent  Transfers:  Sit to Stand: Independent  Stand to Sit: Independent                       Balance:   Sitting: Intact  Standing: Intact  Ambulation/Gait Training:  Distance (ft): 200 Feet (ft)  Assistive Device: Gait belt  Ambulation - Level of Assistance: Independent                 Stairs:  Number of Stairs Trained: 7  Stairs - Level of Assistance: Stand-by assistance   Rail Use: Right         Functional Measure:  Barthel Index:    Bathin  Bladder: 10  Bowels: 10  Groomin  Dressing: 10  Feeding: 10  Mobility: 15  Stairs: 10  Toilet Use: 10  Transfer (Bed to Chair and Back): 15  Total: 95/100       The Barthel ADL Index: Guidelines  1. The index should be used as a record of what a patient does, not as a record of what a patient could do. 2. The main aim is to establish degree of independence from any help, physical or verbal, however minor and for whatever reason. 3. The need for supervision renders the patient not independent. 4. A patient's performance should be established using the best available evidence. Asking the patient, friends/relatives and nurses are the usual sources, but direct observation and common sense are also important. However direct testing is not needed. 5. Usually the patient's performance over the preceding 24-48 hours is important, but occasionally longer periods will be relevant. 6. Middle categories imply that the patient supplies over 50 per cent of the effort. 7. Use of aids to be independent is allowed. Score Interpretation (from [address] [place] [address])    Independent   60-79 Minimally independent   40-59 Partially dependent   20-39 Very dependent   <20 Totally dependent     -Allen Chang., Barthel, D.W. (1965).  Functional evaluation: the Barthel Index. [address] [facility] ([address] [facility] [address] (1997). The Barthel activities of daily living index: self-reporting versus actual performance in the old (> or = 75 years). Journal of [address] [place] [address] 45(7), [address] [facility], JPIPPA.F, [facility]., [facility]. (1999). Measuring the change in disability after inpatient rehabilitation; comparison of the responsiveness of the Barthel Index and Functional [place] Measure. Journal of Neurology, Neurosurgery, and Psychiatry, 66(4), 730-408. Clemencia Gilford, N.J.A, CHERRY Dugan, & Jennifer Hollins M.A. (2004) Assessment of post-stroke quality of life in cost-effectiveness studies: The usefulness of the Barthel Index and the EuroQoL-5D. Quality of Life Research, 15, 484-65            Physical Therapy Evaluation Charge Determination   History Examination Presentation Decision-Making   HIGH Complexity :3+ comorbidities / personal factors will impact the outcome/ POC  LOW Complexity : 1-2 Standardized tests and measures addressing body structure, function, activity limitation and / or participation in recreation  LOW Complexity : Stable, uncomplicated  Other outcome measures barthel  LOW       Based on the above components, the patient evaluation is determined to be of the following complexity level: LOW     Pain Rating:  No major complaints     Activity Tolerance:   Good      After treatment patient left in no apparent distress:   Supine in bed and Call bell within reach    COMMUNICATION/EDUCATION:   The patients plan of care was discussed with: Registered nurse. Fall prevention education was provided and the patient/caregiver indicated understanding. and Patient/family agree to work toward stated goals and plan of care.     Thank you for this referral.  Marcianne Aschoff, PT, DPT   Time Calculation: 19 mins

## 2023-01-14 NOTE — DISCHARGE INSTRUCTIONS
Please call [phone] to make a follow up appointment with Dr Hayder Bautista this week for drain removal    [employer] Surgical Specialist of [address] [facility], [address] [facility], [address] [facility]  Amber Milan  [phone]  Fax [phone]

## 2023-01-14 NOTE — PROGRESS NOTES
End of Shift Note    Bedside shift change report given to [address] (oncoming nurse) by Arron Wilson RN (offgoing nurse). Report included the following information SBAR and Kardex    Shift worked:  0113-1721     Shift summary and any significant changes:    Continue plan of care, pain managed with current medications ordered. Ashton was removed and tolerated well. ANNE drains continue to drain. Concerns for physician to address:  Plan of care       5498       Activity:  Activity Level: Up with Assistance  Number times ambulated in hallways past shift: 0  Number of times OOB to chair past shift: 0    Cardiac:   Cardiac Monitoring: No      Cardiac Rhythm: Sinus Rhythm    Access:  Current line(s): PIV     Genitourinary:   Urinary status: ashton    Respiratory:   O2 Device: None (Room air)  Chronic home O2 use?: NO  Incentive spirometer at bedside: NO       GI:     Current diet:  ADULT DIET Regular; NPO while in bed. Can have diet as ordered if in a chair.   Passing flatus: YES  Tolerating current diet: YES       Pain Management:   Patient states pain is manageable on current regimen: YES    Skin:  Chris Score: 22  Interventions: increase time out of bed    Patient Safety:  Fall Score:    Interventions: gripper socks       Length of Stay:  Expected LOS: - - -  Actual LOS: 0      Arron Wilson RN

## 2023-01-14 NOTE — PROGRESS NOTES
Problem: Pain  Goal: *Control of Pain  Outcome: Progressing Towards Goal     Problem: Patient Education: Go to Patient Education Activity  Goal: Patient/Family Education  Outcome: Progressing Towards Goal     Problem: Falls - Risk of  Goal: *Absence of Falls  Description: Document Isiah Choi Fall Risk and appropriate interventions in the flowsheet.   Outcome: Progressing Towards Goal  Note: Fall Risk Interventions:

## 2023-01-14 NOTE — PROGRESS NOTES
DISCHARGE NOTE FROM [facility] NURSE    Patient determined to be stable for discharge by attending provider. I have reviewed the discharge instructions and follow-up appointments with the patient. They verbalized understanding and all questions were answered to their satisfaction. No complaints or further questions were expressed. Medications sent to pharmacy. Appropriate educational materials and medication side effect teaching were provided. PIV were removed prior to discharge. ANNE drain care provided to patient. Teach back method utilized. All personal items collected during admission were returned to the patient prior to discharge. Post-op patient: No, pt states he has a lot of dial soap and understands all SSI infection prevention instructions. Sheets have already been changed, hand washing discussed.       Santhosh Borja RN

## 2023-01-14 NOTE — PROGRESS NOTES
CM noted d/c order. CM met with pt at bedside to discuss. Pt's spouse will transport at d/c.  CM provided pt with [facility] ltr and placed signed copy on chart.      Patrick Horowitz LMSW  Supervisee in [facility], [address] [place] [address]

## 2023-01-16 ENCOUNTER — TELEPHONE (OUTPATIENT)
Dept: SURGERY | Age: 73
End: 2023-01-16

## 2023-01-16 NOTE — TELEPHONE ENCOUNTER
We left one drain in place. Could you have him come to the office later this week for a post op visit.

## 2023-01-18 ENCOUNTER — TELEPHONE (OUTPATIENT)
Dept: SURGERY | Age: 73
End: 2023-01-18

## 2023-01-18 NOTE — TELEPHONE ENCOUNTER
That's right. He should removed all of it. And replace some gauze and tape over the drain site. Thanks.

## 2023-01-18 NOTE — TELEPHONE ENCOUNTER
Spoke with patient and let him know per Dr Radha Demarco: remove the dressing and prevena machine and cover incision with gauze and secure with tape. I told him to leave the drain in, Dr Radha Demarco will assess that tomorrow.

## 2023-01-18 NOTE — TELEPHONE ENCOUNTER
Patient called and said that his prevena machine is showing low battery and he is not sure how to charge it or change the battery. He has no drainage canister. I told him that most likely Dr Jorgensen Better will have him remove the drsg and machine but I would speak with Dr Jorgensen Better and get back with him.

## 2023-01-19 ENCOUNTER — OFFICE VISIT (OUTPATIENT)
Dept: SURGERY | Age: 73
End: 2023-01-19
Payer: MEDICARE

## 2023-01-19 VITALS
HEART RATE: 86 BPM | HEIGHT: 71 IN | BODY MASS INDEX: 25.42 KG/M2 | RESPIRATION RATE: 16 BRPM | WEIGHT: 181.6 LBS | OXYGEN SATURATION: 97 % | DIASTOLIC BLOOD PRESSURE: 72 MMHG | TEMPERATURE: 96.1 F | SYSTOLIC BLOOD PRESSURE: 136 MMHG

## 2023-01-19 DIAGNOSIS — Z09 POSTOPERATIVE EXAMINATION: Primary | ICD-10-CM

## 2023-01-19 PROCEDURE — 99024 POSTOP FOLLOW-UP VISIT: CPT | Performed by: SURGERY

## 2023-01-19 NOTE — PROGRESS NOTES
Chief Complaint   Patient presents with    Post OP Follow Up     S/P OPEN INCISIONAL HERNIA REPAIR WITH MESH WITH SEPARATION OF COMPONENTS 1-       Reviewed pathology: Benign hernia sac    Tolerating PO  Pain controlled  Appetite okay  BMs normal    ANNE output about 50-60 / day      Physical Exam:   Abdominal exam: soft  non-distended  appropriatly tender. Prevena removed. Wound: clean, dry, no drainage    Doing well  Continue Tylenol. Add Aleve. Will leave ANNE drain another week.   Continue restricted activity for a total of 4 weeks  Follow-up: next week        Julio Dance MD FACS

## 2023-01-19 NOTE — PROGRESS NOTES
Identified pt with two pt identifiers(name and ). Reviewed record in preparation for visit and have obtained necessary documentation. All patient medications has been reviewed. Chief Complaint   Patient presents with    Post OP Follow Up     S/P OPEN INCISIONAL HERNIA REPAIR WITH MESH WITH SEPARATION OF COMPONENTS 2023       Health Maintenance Due   Topic    Hepatitis C Screening     Colorectal Cancer Screening Combo     Shingles Vaccine (1 of 2)    Medicare Yearly Exam     Lipid Screen     COVID-19 Vaccine (4 - Booster)    DTaP/Tdap/Td series (2 - Td or Tdap)       Vitals:    23 0905   BP: 136/72   Pulse: 86   Resp: 16   Temp: (!) 96.1 °F (35.6 °C)   TempSrc: Temporal   SpO2: 97%   Weight: 181 lb 9.6 oz (82.4 kg)   Height: 5' 11\" (1.803 m)   PainSc:   2   PainLoc: Back       4. Have you been to the ER, urgent care clinic since your last visit? Hospitalized since your last visit? No    5. Have you seen or consulted any other health care providers outside of the [address] [place] [address] since your last visit? Include any pap smears or colon screening. No      Patient is accompanied by self I have received verbal consent from Cortney Rosales to discuss any/all medical information while they are present in the room.

## 2023-01-19 NOTE — LETTER
1/19/2023    Patient: Kate Fraga   YOB: 1950   Date of Visit: 1/19/2023     Mando Loyd MD  [address] [address]  Via Fax: [phone]     Makenzie Hanson MD  Via     Dear MD Makenzie Valladares MD,      Thank you for referring Mr. Anastacio Hogue to  [facility] for evaluation. My notes for this consultation are attached. If you have questions, please do not hesitate to call me. I look forward to following your patient along with you.       Sincerely,    Ezekiel Martini MD

## 2023-01-25 ENCOUNTER — OFFICE VISIT (OUTPATIENT)
Dept: SURGERY | Age: 73
End: 2023-01-25
Payer: MEDICARE

## 2023-01-25 VITALS
OXYGEN SATURATION: 98 % | DIASTOLIC BLOOD PRESSURE: 62 MMHG | HEIGHT: 71 IN | HEART RATE: 76 BPM | WEIGHT: 184.4 LBS | TEMPERATURE: 97.8 F | BODY MASS INDEX: 25.81 KG/M2 | RESPIRATION RATE: 20 BRPM | SYSTOLIC BLOOD PRESSURE: 110 MMHG

## 2023-01-25 DIAGNOSIS — Z09 POSTOPERATIVE EXAMINATION: Primary | ICD-10-CM

## 2023-01-25 PROCEDURE — 99024 POSTOP FOLLOW-UP VISIT: CPT | Performed by: SURGERY

## 2023-01-25 RX ORDER — LATANOPROST 50 UG/ML
1 SOLUTION/ DROPS OPHTHALMIC
COMMUNITY
Start: 2023-01-03

## 2023-01-25 NOTE — PROGRESS NOTES
Chief Complaint   Patient presents with    Surgical Follow-up     S/p Myocutaneous advancement flap of the rectus muscle, right, Myocutaneous advancement flap of the rectus muscle, left, Repair of incisional hernia, 15 cm x 20 cm, Insertion of absorbable mesh in the retrorectal space, Insertion of permanent mesh overlay on 1/13/23     Last week ANNE was draining 50-60 a day. Now 30 or less per day. Tolerating PO  Pain controlled    Physical Exam:   Abdominal exam: soft  non-distended  Non-tender. Staples remove and replaced with steristrips. ANNE removed.   Wound: clean, dry, no drainage    Doing well  Continue restricted activity for a total of 4 weeks  Follow-up: 4-5 weeks        Hai Cobb MD FACS

## 2023-01-25 NOTE — PROGRESS NOTES
Identified pt with two pt identifiers(name and ). Reviewed record in preparation for visit and have obtained necessary documentation. All patient medications has been reviewed. Chief Complaint   Patient presents with    Surgical Follow-up     S/p Myocutaneous advancement flap of the rectus muscle, right, Myocutaneous advancement flap of the rectus muscle, left, Repair of incisional hernia, 15 cm x 20 cm, Insertion of absorbable mesh in the retrorectal space, Insertion of permanent mesh overlay on 23       Health Maintenance Due   Topic    Hepatitis C Screening     Colorectal Cancer Screening Combo     Shingles Vaccine (1 of 2)    Medicare Yearly Exam     Lipid Screen     COVID-19 Vaccine (4 - Booster)    DTaP/Tdap/Td series (2 - Td or Tdap)       Vitals:    23 1033   BP: 110/62   Pulse: 76   Resp: 20   Temp: 97.8 °F (36.6 °C)   TempSrc: Temporal   SpO2: 98%   Weight: 83.6 kg (184 lb 6.4 oz)   Height: 5' 11\" (1.803 m)   PainSc:   2   PainLoc: Groin       4. Have you been to the ER, urgent care clinic since your last visit? Hospitalized since your last visit? No    5. Have you seen or consulted any other health care providers outside of the [address] [place] [address] since your last visit? Include any pap smears or colon screening. No      Patient is accompanied by self I have received verbal consent from Lenore Corcoran to discuss any/all medical information while they are present in the room.

## 2023-02-13 ENCOUNTER — OFFICE VISIT (OUTPATIENT)
Dept: SURGERY | Age: 73
End: 2023-02-13
Payer: MEDICARE

## 2023-02-13 VITALS
HEIGHT: 71 IN | DIASTOLIC BLOOD PRESSURE: 60 MMHG | WEIGHT: 182.6 LBS | OXYGEN SATURATION: 99 % | BODY MASS INDEX: 25.56 KG/M2 | SYSTOLIC BLOOD PRESSURE: 108 MMHG | HEART RATE: 62 BPM | RESPIRATION RATE: 20 BRPM | TEMPERATURE: 98 F

## 2023-02-13 DIAGNOSIS — Z09 POSTOPERATIVE EXAMINATION: Primary | ICD-10-CM

## 2023-02-13 PROCEDURE — 99024 POSTOP FOLLOW-UP VISIT: CPT | Performed by: SURGERY

## 2023-02-13 NOTE — PROGRESS NOTES
Chief Complaint   Patient presents with    Surgical Follow-up     S/p Myocutaneous advancement flap of the rectus muscle, right, Myocutaneous advancement flap of the rectus muscle, left, Repair of incisional hernia, 15 cm x 20 cm, Insertion of absorbable mesh in the retrorectal space, Insertion of permanent mesh overlay on 1/13/2023       Tolerating PO  Pain controlled     Physical Exam:   Abdominal exam: soft  non-distended  Non-tender. Some postop swelling remains in the lower aspect of his abdomen. No acute skin changes. Wound: clean, dry, no drainage     Doing well overall  Reviewed incision care. I expect the swelling where we reconstruct his abdominal wall to slowly go down. He will call me if it does not resolve or swelling worsens. He expressed understanding of our discussion and is happy with the results and referred one of his neighbors to me for a hernia and is agreeable with the plan.

## 2023-02-13 NOTE — Clinical Note
2/13/2023    Patient: Ari Castillo   YOB: 1950   Date of Visit: 2/13/2023     David Cody MD  [address] [address]  Via Fax: [phone]     Cynthia Rondon MD  Via     Dear MD Cynthia Sanchez MD,      Thank you for referring Mr. Martha Waite to  [facility] for evaluation. My notes for this consultation are attached. If you have questions, please do not hesitate to call me. I look forward to following your patient along with you.       Sincerely,    Joe Marshall MD

## 2023-02-13 NOTE — PROGRESS NOTES
Identified pt with two pt identifiers(name and ). Reviewed record in preparation for visit and have obtained necessary documentation. All patient medications has been reviewed. Chief Complaint   Patient presents with    Surgical Follow-up     S/p Myocutaneous advancement flap of the rectus muscle, right, Myocutaneous advancement flap of the rectus muscle, left, Repair of incisional hernia, 15 cm x 20 cm, Insertion of absorbable mesh in the retrorectal space, Insertion of permanent mesh overlay on 2023       Health Maintenance Due   Topic    Hepatitis C Screening     Colorectal Cancer Screening Combo     Shingles Vaccine (1 of 2)    Medicare Yearly Exam     Lipid Screen     COVID-19 Vaccine (4 - Booster)    DTaP/Tdap/Td series (2 - Td or Tdap)       Vitals:    23 1303   BP: 108/60   Pulse: 62   Resp: 20   Temp: 98 °F (36.7 °C)   TempSrc: Temporal   SpO2: 99%   Weight: 82.8 kg (182 lb 9.6 oz)   Height: 5' 11\" (1.803 m)   PainSc:   1   PainLoc: Abdomen       4. Have you been to the ER, urgent care clinic since your last visit? Hospitalized since your last visit? No    5. Have you seen or consulted any other health care providers outside of the [address] [place] [address] since your last visit? Include any pap smears or colon screening. No      Patient is accompanied by self I have received verbal consent from Shane Nava to discuss any/all medical information while they are present in the room.

## 2023-10-31 ENCOUNTER — HOSPITAL ENCOUNTER (OUTPATIENT)
Facility: HOSPITAL | Age: 73
Discharge: HOME | End: 2023-10-31
Attending: INTERNAL MEDICINE | Admitting: INTERNAL MEDICINE
Payer: MEDICARE

## 2023-10-31 ENCOUNTER — ANESTHESIA EVENT (OUTPATIENT)
Facility: HOSPITAL | Age: 73
End: 2023-10-31
Payer: MEDICARE

## 2023-10-31 ENCOUNTER — ANESTHESIA (OUTPATIENT)
Facility: HOSPITAL | Age: 73
End: 2023-10-31
Payer: MEDICARE

## 2023-10-31 VITALS
HEIGHT: 71 IN | OXYGEN SATURATION: 99 % | SYSTOLIC BLOOD PRESSURE: 123 MMHG | TEMPERATURE: 98.7 F | DIASTOLIC BLOOD PRESSURE: 75 MMHG | HEART RATE: 61 BPM | BODY MASS INDEX: 24.08 KG/M2 | WEIGHT: 172 LBS | RESPIRATION RATE: 16 BRPM

## 2023-10-31 PROCEDURE — 3600007502: Performed by: INTERNAL MEDICINE

## 2023-10-31 PROCEDURE — 3700000000 HC ANESTHESIA ATTENDED CARE: Performed by: INTERNAL MEDICINE

## 2023-10-31 PROCEDURE — 2709999900 HC NON-CHARGEABLE SUPPLY: Performed by: INTERNAL MEDICINE

## 2023-10-31 PROCEDURE — 7100000010 HC PHASE II RECOVERY - FIRST 15 MIN: Performed by: INTERNAL MEDICINE

## 2023-10-31 PROCEDURE — 3600007512: Performed by: INTERNAL MEDICINE

## 2023-10-31 PROCEDURE — 7100000011 HC PHASE II RECOVERY - ADDTL 15 MIN: Performed by: INTERNAL MEDICINE

## 2023-10-31 PROCEDURE — 2580000003 HC RX 258: Performed by: INTERNAL MEDICINE

## 2023-10-31 PROCEDURE — 6360000002 HC RX W HCPCS: Performed by: NURSE ANESTHETIST, CERTIFIED REGISTERED

## 2023-10-31 PROCEDURE — 3700000001 HC ADD 15 MINUTES (ANESTHESIA): Performed by: INTERNAL MEDICINE

## 2023-10-31 PROCEDURE — 88305 TISSUE EXAM BY PATHOLOGIST: CPT

## 2023-10-31 PROCEDURE — 2500000003 HC RX 250 WO HCPCS: Performed by: NURSE ANESTHETIST, CERTIFIED REGISTERED

## 2023-10-31 RX ORDER — LIDOCAINE HYDROCHLORIDE 20 MG/ML
INJECTION, SOLUTION EPIDURAL; INFILTRATION; INTRACAUDAL; PERINEURAL PRN
Status: DISCONTINUED | OUTPATIENT
Start: 2023-10-31 | End: 2023-10-31 | Stop reason: SDUPTHER

## 2023-10-31 RX ORDER — CYCLOBENZAPRINE HCL 5 MG
5 TABLET ORAL 3 TIMES DAILY PRN
COMMUNITY

## 2023-10-31 RX ORDER — SODIUM CHLORIDE 9 MG/ML
25 INJECTION, SOLUTION INTRAVENOUS PRN
Status: DISCONTINUED | OUTPATIENT
Start: 2023-10-31 | End: 2023-10-31 | Stop reason: HOSPADM

## 2023-10-31 RX ORDER — SODIUM CHLORIDE 9 MG/ML
INJECTION, SOLUTION INTRAVENOUS CONTINUOUS
Status: DISCONTINUED | OUTPATIENT
Start: 2023-10-31 | End: 2023-10-31 | Stop reason: HOSPADM

## 2023-10-31 RX ORDER — SODIUM CHLORIDE 0.9 % (FLUSH) 0.9 %
5-40 SYRINGE (ML) INJECTION EVERY 12 HOURS SCHEDULED
Status: DISCONTINUED | OUTPATIENT
Start: 2023-10-31 | End: 2023-10-31 | Stop reason: HOSPADM

## 2023-10-31 RX ORDER — SODIUM CHLORIDE 0.9 % (FLUSH) 0.9 %
5-40 SYRINGE (ML) INJECTION PRN
Status: DISCONTINUED | OUTPATIENT
Start: 2023-10-31 | End: 2023-10-31 | Stop reason: HOSPADM

## 2023-10-31 RX ADMIN — SODIUM CHLORIDE: 9 INJECTION, SOLUTION INTRAVENOUS at 13:28

## 2023-10-31 RX ADMIN — PROPOFOL 50 MG: 10 INJECTION, EMULSION INTRAVENOUS at 13:46

## 2023-10-31 RX ADMIN — PROPOFOL 50 MG: 10 INJECTION, EMULSION INTRAVENOUS at 13:44

## 2023-10-31 RX ADMIN — PROPOFOL 25 MG: 10 INJECTION, EMULSION INTRAVENOUS at 14:04

## 2023-10-31 RX ADMIN — PROPOFOL 25 MG: 10 INJECTION, EMULSION INTRAVENOUS at 13:57

## 2023-10-31 RX ADMIN — PROPOFOL 50 MG: 10 INJECTION, EMULSION INTRAVENOUS at 13:48

## 2023-10-31 RX ADMIN — PROPOFOL 50 MG: 10 INJECTION, EMULSION INTRAVENOUS at 13:51

## 2023-10-31 RX ADMIN — PROPOFOL 25 MG: 10 INJECTION, EMULSION INTRAVENOUS at 14:00

## 2023-10-31 RX ADMIN — PROPOFOL 50 MG: 10 INJECTION, EMULSION INTRAVENOUS at 13:54

## 2023-10-31 RX ADMIN — LIDOCAINE HYDROCHLORIDE 50 MG: 20 INJECTION, SOLUTION EPIDURAL; INFILTRATION; INTRACAUDAL; PERINEURAL at 13:44

## 2023-10-31 ASSESSMENT — PAIN - FUNCTIONAL ASSESSMENT: PAIN_FUNCTIONAL_ASSESSMENT: NONE - DENIES PAIN

## 2023-10-31 NOTE — H&P
[address] [facility], [facility]          Pre-procedure History and Physical       NAME:  Amalia De León   :   1950   MRN:   [id number]     CHIEF COMPLAINT/HPI: colon polyps    PMH:  Past Medical History:   Diagnosis Date    Arthritis     Chronic pain     back    Family history of prostate cancer     Hypercholesteremia     Seen Dr Devyn Polo - cardiologist [facility]    Hypertrophy of prostate with urinary obstruction and other lower urinary tract symptoms (LUTS)     Kidney stone     Lumbar pain     Malignant neoplasm of prostate ([address]) 2012    cT1c le[place] 3 + 4 adenocarcinoma of the prostate under active surveillance since 2012    Muscle weakness     Nephrolithiasis     Prostate cancer ([address]) 2014    brachy done at [facility]    SBO (small bowel obstruction) ([address])     resolved    Sleep apnea     uses cpap    Ventral incisional hernia 2022       PSH:  Past Surgical History:   Procedure Laterality Date    HERNIA REPAIR  2023    Myocutaneous advancement flap of the rectus muscle, right, Myocutaneous advancement flap of the rectus muscle, left, Repair of incisional hernia, 15 cm x 20 cm, Insertion of absorbable mesh in the retrorectal space, Insertion of permanent mesh overlay    LITHOTRIPSY      done twice, then surgical removal of stone with second stone    LUMBAR FUSION      L4-S1     LUMBAR FUSION  2004    L1-L2     LUMBAR FUSION      L1-2 FACET JOINT OBLATION AT [facility](10-15%RELIEF)    OTHER SURGICAL HISTORY  2014    brachy at [facility], prostate procedure with radiation seeds    OTHER SURGICAL HISTORY      exploratory surgery on left foot    UROLOGICAL SURGERY  2000    [facility]    UROLOGICAL SURGERY  2012    PNBx - TRUS Vol 39.02 cc's, [place] 6(3+3)left apex, apex lat, base lat, Dr Vivien Artis  2007    Dr Luis Kelly       Allergies:   Allergies   Allergen Reactions    Hydromorphone Other (See Comments)     Pt states\" did

## 2023-10-31 NOTE — ANESTHESIA POSTPROCEDURE EVALUATION
Department of Anesthesiology  Postprocedure Note    Patient: Bennie Pulliam  MRN: [id number]  YOB: 1950  Date of evaluation: 10/31/2023      Procedure Summary     Date: 10/31/23 Room / Location: [facility] ENDO 03 / [facility] ENDOSCOPY    Anesthesia Start: 1343 Anesthesia Stop: 9102    Procedure: COLONOSCOPY, polypectomy Diagnosis:       Hx of colonic polyps      (Hx of colonic polyps [Z86.010])    Surgeons:  Hoover Ganser, MD Responsible Provider: Matias Robles DO    Anesthesia Type: MAC ASA Status: 3          Anesthesia Type: MAC    Shirin Phase I: Shirin Score: 10    Shirin Phase II: Shirin Score: 10      Anesthesia Post Evaluation    Patient location during evaluation: bedside  Patient participation: complete - patient participated  Level of consciousness: awake and alert  Pain score: 0  Airway patency: patent  Nausea & Vomiting: no nausea and no vomiting  Complications: no  Cardiovascular status: blood pressure returned to baseline and hemodynamically stable  Respiratory status: room air  Hydration status: euvolemic  Pain management: adequate and satisfactory to patient

## 2023-10-31 NOTE — OP NOTE
Ortega Laguerre M.D.  [facility], [address]  [phone]               Colonoscopy Procedure Note    NAME: Ananda Olguin  :  1950  MRN:  [id number]    Indications:   Personal history of colon polyps (screening only)     : Dallis Klinefelter, MD    Referring Provider:  Leonie Mora MD    Staff: Circulator: Ciera Bedoya RN  Endoscopy Technician: Sherryle Robert    Prosthetic devices, grafts, tissues, transplant, or devices implanted: none    Medicines:  MAC anesthesia      Procedure Details:  After informed consent was obtained with all risks and benefits of the procedure explained and preprocedure exam completed, the patient was placed in the left lateral decubitus position. Universal protocol for patient identification was performed and documented in the nursing notes. Throughout the procedure, the patient's blood pressure was monitored at least every five minutes; pulse, and oxygen saturations were monitored continuously. All vital signs were documented in the nursing notes. A digital rectal exam was performed and was normal.  The Olympus videocolonoscope  was inserted in the rectum and carefully advanced to the cecum, which was identified by the ileocecal valve and appendiceal orifice. The colonoscope was slowly withdrawn with careful evaluation between folds. Retroflexion in the rectum and second examination of the right colon was performed; findings and interventions are described below. Procedure start time, extent reached time/cecum time, and procedure end time are documented in the nursing notes. The quality of preparation was adequate.        Findings:   5 sessile polyps with the greatest diameter of 6 mm (2 in the cecum, 2 in the ascending, and 1 in the sigmoid colon) s/p cold snare polypectomy    Interventions:    5 complete polypectomy were performed using cold snare  and the polyps were  retrieved    Specimens:   ID Type

## 2023-10-31 NOTE — DISCHARGE INSTRUCTIONS
cancer. Polyps are usually found through routine colon cancer screening tests. Although most colon polyps are not cancerous, they are usually removed and then tested for cancer. Screening for colon cancer saves lives because the cancer can usually be cured if it is caught early. If you have a polyp that is the type that can turn into cancer, you may need more tests to examine your entire colon. The doctor will remove any other polyps that are found, and you will be tested more often. Follow-up care is a key part of your treatment and safety. Be sure to make and go to all appointments, and call your doctor if you are having problems. It's also a good idea to know your test results and keep a list of the medicines you take. How can you care for yourself at home? Regular exams to look for colon polyps are the best way to prevent polyps from turning into colon cancer. These can include stool tests, sigmoidoscopy, colonoscopy, and CT colonography. Talk with your doctor about a testing schedule that is right for you. To prevent polyps  There is no home treatment that can prevent colon polyps. But these steps may help lower your risk for cancer. Stay active. Being active can help you get to and stay at a healthy weight. Try to exercise on most days of the week. Walking is a good choice. Eat well. Choose a variety of vegetables, fruits, legumes (such as peas and beans), fish, poultry, and whole grains. Do not smoke. If you need help quitting, talk to your doctor about stop-smoking programs and medicines. These can increase your chances of quitting for good. If you drink alcohol, limit how much you drink. Limit alcohol to 2 drinks a day for men and 1 drink a day for women. When should you call for help? Call your doctor now or seek immediate medical care if:    You have severe belly pain. Your stools are maroon or very bloody.    Watch closely for changes in your health, and be sure to contact your doctor

## 2024-05-16 NOTE — PERIOP NOTES
TRANSFER - OUT REPORT:    Verbal report given to Collins Mora (name) on Harris Isaac  being transferred to [facility] (unit) for routine progression of care       Report consisted of patients Situation, Background, Assessment and   Recommendations(SBAR). Information from the following report(s) SBAR, OR Summary, Procedure Summary, Intake/Output, MAR, Recent Results, and Cardiac Rhythm NSR   was reviewed with the receiving nurse. Opportunity for questions and clarification was provided.       Patient transported with:   O2 @ 1 liters  Tech [Alert] : alert [Well Nourished] : well nourished [No Acute Distress] : no acute distress [Well Developed] : well developed [Normal Sclera/Conjunctiva] : normal sclera/conjunctiva [EOMI] : extra ocular movement intact [No Proptosis] : no proptosis [Normal Lips/Gums] : the lips and gums were normal [Normal Oropharynx] : the oropharynx was normal [Thyroid Not Enlarged] : the thyroid was not enlarged [No Thyroid Nodules] : no palpable thyroid nodules [No Respiratory Distress] : no respiratory distress [No Accessory Muscle Use] : no accessory muscle use [Clear to Auscultation] : lungs were clear to auscultation bilaterally [Normal S1, S2] : normal S1 and S2 [Normal Rate] : heart rate was normal [Regular Rhythm] : with a regular rhythm [No Edema] : no peripheral edema [Normal Bowel Sounds] : normal bowel sounds [Not Tender] : non-tender [Not Distended] : not distended [Soft] : abdomen soft [Normal Anterior Cervical Nodes] : no anterior cervical lymphadenopathy [No Spinal Tenderness] : no spinal tenderness [No Stigmata of Cushings Syndrome] : no stigmata of Cushings Syndrome [Normal Gait] : normal gait [Normal Strength/Tone] : muscle strength and tone were normal [No Rash] : no rash [No Tremors] : no tremors [Oriented x3] : oriented to person, place, and time [Acanthosis Nigricans] : no acanthosis nigricans [de-identified] : mild  negative nose/mouth/pharynx detailed exam

## (undated) DEVICE — ADHESIVE SKIN CLSR 0.7ML TOP DERMBND ADV

## (undated) DEVICE — SOLUTION IRRIG 1000ML 0.9% SOD CHL USP POUR PLAS BTL

## (undated) DEVICE — 4-PORT MANIFOLD: Brand: NEPTUNE 2

## (undated) DEVICE — MAJOR LAPAROTOMY-MRMC: Brand: MEDLINE INDUSTRIES, INC.

## (undated) DEVICE — AEGIS 1" DISK 4MM HOLE, PEEL OPEN: Brand: MEDLINE

## (undated) DEVICE — SUTURE VCRL SZ 0 L27IN ABSRB UD SH L26MM 1/2 CIR TAPERPOINT J418H

## (undated) DEVICE — KIT,1200CC CANISTER,3/16"X6' TUBING: Brand: MEDLINE INDUSTRIES, INC.

## (undated) DEVICE — SUTURE PDS II SZ 2-0 L27IN ABSRB VLT L36MM CT-1 1/2 CIR Z339H

## (undated) DEVICE — INTENDED FOR TISSUE SEPARATION, AND OTHER PROCEDURES THAT REQUIRE A SHARP SURGICAL BLADE TO PUNCTURE OR CUT.: Brand: BARD-PARKER ® CARBON RIB-BACK BLADES

## (undated) DEVICE — SUTURE ABSORBABLE BRAIDED 3-0 SHB 18 IN UD VICRYL + VCPB864D

## (undated) DEVICE — SUTURE STRATAFIX SYMMETRIC SZ 1 L18IN ABSRB VLT CT1 L36CM SXPP1A404

## (undated) DEVICE — 3M™ TEGADERM™ TRANSPARENT FILM DRESSING FRAME STYLE, 1624W, 2-3/8 IN X 2-3/4 IN (6 CM X 7 CM), 100/CT 4CT/CASE: Brand: 3M™ TEGADERM™

## (undated) DEVICE — Device

## (undated) DEVICE — TRAP SURG QUAD PARABOLA SLOT DSGN SFTY SCRN TRAPEASE

## (undated) DEVICE — SUTURE VCRL SZ 2-0 L36IN ABSRB UD L36MM CT-1 1/2 CIR J945H

## (undated) DEVICE — SUTURE STRATAFIX SPRL MCRYL + SZ 4-0 L12IN ABSRB UD PS-2 SXMP1B117

## (undated) DEVICE — SUTURE STRATAFIX SZ 3-0 30CM NONABSORB UD 26MM FS 3/8 SXMP2B412

## (undated) DEVICE — BINDER ABD M/L H12IN FOR 46-62IN WHT 4 SLD PNL DSGN HOOP

## (undated) DEVICE — GLOVE SURG SZ 7.5 L11.2IN THK9.8MIL STRW LTX POLYMER BEAD

## (undated) DEVICE — SUTURE VCRL SZ 2-0 L27IN ABSRB UD L26MM SH 1/2 CIR J417H

## (undated) DEVICE — SNARE ENDOSCP L240CM OD24MM LOOP W10MM RND INSUL STIFF BRAID

## (undated) DEVICE — SUTURE PROL SZ 2-0 L36IN NONABSORBABLE BLU SH L26MM 1/2 CIR 8523H

## (undated) DEVICE — DRAIN SURG W10MMXL20CM SIL FULL PERF HUBLESS FLAT RADPQ

## (undated) DEVICE — 3M™ IOBAN™ 2 ANTIMICROBIAL INCISE DRAPE 6651EZ: Brand: IOBAN™ 2

## (undated) DEVICE — YANKAUER,POOLE TIP,STERILE,50/CS: Brand: MEDLINE